# Patient Record
Sex: FEMALE | Race: WHITE | NOT HISPANIC OR LATINO | Employment: OTHER | URBAN - METROPOLITAN AREA
[De-identification: names, ages, dates, MRNs, and addresses within clinical notes are randomized per-mention and may not be internally consistent; named-entity substitution may affect disease eponyms.]

---

## 2021-06-11 ENCOUNTER — HOSPITAL ENCOUNTER (EMERGENCY)
Facility: HOSPITAL | Age: 86
Discharge: HOME | End: 2021-06-12
Attending: EMERGENCY MEDICINE | Admitting: EMERGENCY MEDICINE
Payer: MEDICARE

## 2021-06-11 ENCOUNTER — APPOINTMENT (EMERGENCY)
Dept: CT IMAGING | Facility: HOSPITAL | Age: 86
End: 2021-06-11
Payer: MEDICARE

## 2021-06-11 ENCOUNTER — APPOINTMENT (EMERGENCY)
Dept: RADIOLOGY | Facility: HOSPITAL | Age: 86
End: 2021-06-11
Payer: MEDICARE

## 2021-06-11 VITALS
HEART RATE: 75 BPM | TEMPERATURE: 99.5 F | OXYGEN SATURATION: 98 % | DIASTOLIC BLOOD PRESSURE: 67 MMHG | SYSTOLIC BLOOD PRESSURE: 146 MMHG | RESPIRATION RATE: 18 BRPM

## 2021-06-11 DIAGNOSIS — R10.13 EPIGASTRIC PAIN: Primary | ICD-10-CM

## 2021-06-11 DIAGNOSIS — R14.1 GAS PAIN: ICD-10-CM

## 2021-06-11 LAB
ALBUMIN SERPL BCP-MCNC: 3.9 G/DL (ref 3.4–4.8)
ALP SERPL-CCNC: 47.4 U/L (ref 35–140)
ALT SERPL W P-5'-P-CCNC: 11 U/L (ref 5–54)
ANION GAP SERPL CALCULATED.3IONS-SCNC: 10 MMOL/L (ref 4–13)
AST SERPL W P-5'-P-CCNC: 21 U/L (ref 15–41)
BASOPHILS # BLD AUTO: 0.03 THOUSANDS/ΜL (ref 0–0.1)
BASOPHILS NFR BLD AUTO: 0 % (ref 0–1)
BILIRUB SERPL-MCNC: 0.47 MG/DL (ref 0.3–1.2)
BNP SERPL-MCNC: 164 PG/ML (ref 1–100)
BUN SERPL-MCNC: 21 MG/DL (ref 6–20)
CALCIUM SERPL-MCNC: 9.3 MG/DL (ref 8.4–10.2)
CHLORIDE SERPL-SCNC: 104 MMOL/L (ref 96–108)
CO2 SERPL-SCNC: 29 MMOL/L (ref 22–33)
CREAT SERPL-MCNC: 0.87 MG/DL (ref 0.4–1.1)
EOSINOPHIL # BLD AUTO: 0.07 THOUSAND/ΜL (ref 0–0.61)
EOSINOPHIL NFR BLD AUTO: 0 % (ref 0–6)
ERYTHROCYTE [DISTWIDTH] IN BLOOD BY AUTOMATED COUNT: 12.9 % (ref 11.6–15.1)
GFR SERPL CREATININE-BSD FRML MDRD: 57 ML/MIN/1.73SQ M
GLUCOSE SERPL-MCNC: 156 MG/DL (ref 65–140)
HCT VFR BLD AUTO: 42 % (ref 34.8–46.1)
HGB BLD-MCNC: 13.5 G/DL (ref 11.5–15.4)
IMM GRANULOCYTES # BLD AUTO: 0.04 THOUSAND/UL (ref 0–0.2)
IMM GRANULOCYTES NFR BLD AUTO: 0 % (ref 0–2)
LIPASE SERPL-CCNC: 22 U/L (ref 13–60)
LYMPHOCYTES # BLD AUTO: 0.96 THOUSANDS/ΜL (ref 0.6–4.47)
LYMPHOCYTES NFR BLD AUTO: 6 % (ref 14–44)
MAGNESIUM SERPL-MCNC: 1.6 MG/DL (ref 1.6–2.6)
MCH RBC QN AUTO: 30.8 PG (ref 26.8–34.3)
MCHC RBC AUTO-ENTMCNC: 32.1 G/DL (ref 31.4–37.4)
MCV RBC AUTO: 96 FL (ref 82–98)
MONOCYTES # BLD AUTO: 0.99 THOUSAND/ΜL (ref 0.17–1.22)
MONOCYTES NFR BLD AUTO: 6 % (ref 4–12)
NEUTROPHILS # BLD AUTO: 14.64 THOUSANDS/ΜL (ref 1.85–7.62)
NEUTS SEG NFR BLD AUTO: 88 % (ref 43–75)
PLATELET # BLD AUTO: 227 THOUSANDS/UL (ref 149–390)
PMV BLD AUTO: 10.4 FL (ref 8.9–12.7)
POTASSIUM SERPL-SCNC: 4.3 MMOL/L (ref 3.5–5)
PROT SERPL-MCNC: 6.9 G/DL (ref 6.4–8.3)
RBC # BLD AUTO: 4.39 MILLION/UL (ref 3.81–5.12)
SODIUM SERPL-SCNC: 143 MMOL/L (ref 133–145)
TROPONIN I SERPL-MCNC: <0.03 NG/ML (ref 0–0.07)
WBC # BLD AUTO: 16.73 THOUSAND/UL (ref 4.31–10.16)

## 2021-06-11 PROCEDURE — 80053 COMPREHEN METABOLIC PANEL: CPT | Performed by: EMERGENCY MEDICINE

## 2021-06-11 PROCEDURE — 93005 ELECTROCARDIOGRAM TRACING: CPT

## 2021-06-11 PROCEDURE — 36415 COLL VENOUS BLD VENIPUNCTURE: CPT | Performed by: EMERGENCY MEDICINE

## 2021-06-11 PROCEDURE — 84484 ASSAY OF TROPONIN QUANT: CPT | Performed by: EMERGENCY MEDICINE

## 2021-06-11 PROCEDURE — 71045 X-RAY EXAM CHEST 1 VIEW: CPT

## 2021-06-11 PROCEDURE — 85025 COMPLETE CBC W/AUTO DIFF WBC: CPT | Performed by: EMERGENCY MEDICINE

## 2021-06-11 PROCEDURE — 83880 ASSAY OF NATRIURETIC PEPTIDE: CPT | Performed by: EMERGENCY MEDICINE

## 2021-06-11 PROCEDURE — 99285 EMERGENCY DEPT VISIT HI MDM: CPT

## 2021-06-11 PROCEDURE — 83690 ASSAY OF LIPASE: CPT | Performed by: EMERGENCY MEDICINE

## 2021-06-11 PROCEDURE — 83735 ASSAY OF MAGNESIUM: CPT | Performed by: EMERGENCY MEDICINE

## 2021-06-11 PROCEDURE — 71250 CT THORAX DX C-: CPT

## 2021-06-11 PROCEDURE — 99285 EMERGENCY DEPT VISIT HI MDM: CPT | Performed by: EMERGENCY MEDICINE

## 2021-06-11 RX ORDER — ASPIRIN 300 MG/1
300 SUPPOSITORY RECTAL ONCE
Status: COMPLETED | OUTPATIENT
Start: 2021-06-11 | End: 2021-06-11

## 2021-06-11 RX ORDER — LISINOPRIL 2.5 MG/1
2.5 TABLET ORAL DAILY
COMMUNITY

## 2021-06-11 RX ORDER — PRAVASTATIN SODIUM 40 MG
40 TABLET ORAL DAILY
COMMUNITY

## 2021-06-11 RX ORDER — CITALOPRAM 10 MG/1
10 TABLET ORAL DAILY
COMMUNITY

## 2021-06-11 RX ORDER — SODIUM CHLORIDE 9 MG/ML
3 INJECTION INTRAVENOUS
Status: DISCONTINUED | OUTPATIENT
Start: 2021-06-11 | End: 2021-06-12 | Stop reason: HOSPADM

## 2021-06-11 RX ORDER — VERAPAMIL HYDROCHLORIDE 120 MG/1
120 CAPSULE, EXTENDED RELEASE ORAL EVERY MORNING
COMMUNITY

## 2021-06-11 RX ORDER — DONEPEZIL HYDROCHLORIDE 5 MG/1
5 TABLET, FILM COATED ORAL EVERY MORNING
COMMUNITY

## 2021-06-11 RX ORDER — NYSTATIN 100000 [USP'U]/G
POWDER TOPICAL 2 TIMES DAILY PRN
COMMUNITY

## 2021-06-11 RX ORDER — ASPIRIN 81 MG/1
324 TABLET, CHEWABLE ORAL ONCE
Status: DISCONTINUED | OUTPATIENT
Start: 2021-06-11 | End: 2021-06-12 | Stop reason: HOSPADM

## 2021-06-11 RX ORDER — CLORAZEPATE DIPOTASSIUM 7.5 MG/1
7.5 TABLET ORAL DAILY
COMMUNITY

## 2021-06-11 RX ORDER — ASPIRIN 81 MG/1
81 TABLET ORAL DAILY
COMMUNITY

## 2021-06-11 RX ORDER — LOPERAMIDE HYDROCHLORIDE 2 MG/1
2 CAPSULE ORAL 4 TIMES DAILY PRN
COMMUNITY

## 2021-06-11 RX ORDER — ACETAMINOPHEN 325 MG/1
650 TABLET ORAL EVERY 6 HOURS PRN
COMMUNITY

## 2021-06-11 RX ADMIN — ASPIRIN 300 MG: 300 SUPPOSITORY RECTAL at 22:01

## 2021-06-12 LAB
ATRIAL RATE: 73 BPM
P AXIS: 42 DEGREES
PR INTERVAL: 161 MS
QRS AXIS: -31 DEGREES
QRSD INTERVAL: 80 MS
QT INTERVAL: 412 MS
QTC INTERVAL: 454 MS
VENTRICULAR RATE: 73 BPM

## 2021-06-12 PROCEDURE — 93010 ELECTROCARDIOGRAM REPORT: CPT | Performed by: INTERNAL MEDICINE

## 2021-06-12 NOTE — ED NOTES
Transport Information:  [facility] @ [address] [place] [address], [facility]0 [facility]  06/12/21 0006

## 2021-06-12 NOTE — ED PROCEDURE NOTE
PROCEDURE  ECG 12 Lead Documentation Only    Date/Time: 6/11/2021 9:26 PM  Performed by: Vinny Montesinos MD  Authorized by:  Vinny Montesinos MD     Indications / Diagnosis:  Epigastric pain  ECG reviewed by me, the ED Provider: yes    Patient location:  ED  Previous ECG:     Previous ECG:  Unavailable    Comparison to cardiac monitor: Yes    Interpretation:     Interpretation: abnormal    Rate:     ECG rate:  73    ECG rate assessment: normal    Rhythm:     Rhythm: sinus rhythm    Ectopy:     Ectopy: none    QRS:     QRS axis:  Left    QRS intervals:  Normal  Conduction:     Conduction: normal    ST segments:     ST segments:  Normal  T waves:     T waves: normal    Comments:      No acute ischemia or infarction         Vinny Montesinos MD  06/11/21 0134

## 2021-06-12 NOTE — ED PROVIDER NOTES
History  Chief Complaint   Patient presents with    Abdominal Pain     Pt presents to the ED from a SNF where staff reports pt was crying out for help and stating the she has abdominal pain  This is a 80year old female with a hx of hypertension, hyperlipidemia,OA, Depression, Macular Degeneration,Breast Cancer and Vaginal prolapse  And dementia that presented to the ED via EMS from a local skilled nursing facility  According to EMS, they were called for epigastric pain - they apprise that the patient was yelling help and calling for the nurses - she was holding her epigastric area and reported that she had pain  She was unable to describe the quality or rate the pain - denies radiation of the pain    EMS reports that when they placed her on the stretcher and sat her up she had a large belch and then advised that the pain had subsided  She denies pain on arrival    Alert to self and birthdate only  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  Taking?   acetaminophen (TYLENOL) 325 mg tablet Unknown at Unknown time  Yes No   Sig: Take 650 mg by mouth every 6 (six) hours as needed for mild pain or fever   aspirin (ECOTRIN LOW STRENGTH) 81 mg EC tablet 6/11/2021 at Unknown time  Yes Yes   Sig: Take 81 mg by mouth daily   citalopram (CeleXA) 10 mg tablet 6/11/2021 at Unknown time  Yes Yes   Sig: Take 10 mg by mouth daily   clorazepate (TRANXENE) 7 5 mg tablet 6/11/2021 at Unknown time  Yes Yes   Sig: Take 7 5 mg by mouth daily   donepezil (ARICEPT) 5 mg tablet 6/11/2021 at Unknown time  Yes Yes   Sig: Take 5 mg by mouth every morning   lisinopril (ZESTRIL) 2 5 mg tablet 6/11/2021 at Unknown time  Yes Yes   Sig: Take 2 5 mg by mouth daily   loperamide (IMODIUM) 2 mg capsule Unknown at Unknown time  Yes No   Sig: Take 2 mg by mouth 4 (four) times a day as needed for diarrhea   nystatin (MYCOSTATIN) powder Unknown at Unknown time  Yes No   Sig: Apply topically 2 (two) times a day as needed For irritation   pravastatin (PRAVACHOL) 40 mg tablet 6/11/2021 at Unknown time  Yes Yes   Sig: Take 40 mg by mouth daily   verapamil (VERELAN PM) 120 MG 24 hr capsule 6/11/2021 at Unknown time  Yes Yes   Sig: Take 120 mg by mouth every morning      Facility-Administered Medications: None       History reviewed  No pertinent past medical history  History reviewed  No pertinent surgical history  History reviewed  No pertinent family history  I have reviewed and agree with the history as documented  E-Cigarette/Vaping     E-Cigarette/Vaping Substances     Social History     Tobacco Use    Smoking status: Never Smoker    Smokeless tobacco: Never Used   Substance Use Topics    Alcohol use: Never     Frequency: Never    Drug use: Never       Review of Systems   Constitutional: Negative for activity change, appetite change, chills, diaphoresis, fatigue, fever and unexpected weight change  HENT: Negative for congestion, ear discharge, ear pain, mouth sores, sinus pressure, sinus pain, sneezing, sore throat, trouble swallowing and voice change  Eyes: Negative for photophobia, pain, discharge, redness, itching and visual disturbance  Respiratory: Negative for cough, chest tightness and shortness of breath  Cardiovascular: Negative for chest pain (epigastric), palpitations and leg swelling  Gastrointestinal: Positive for abdominal pain (Epigastric pain)  Negative for constipation, nausea and vomiting  Endocrine: Negative for cold intolerance, heat intolerance, polydipsia, polyphagia and polyuria  Genitourinary: Negative for decreased urine volume, difficulty urinating, dysuria, frequency, hematuria and urgency     Musculoskeletal: Negative for arthralgias, back pain, gait problem, joint swelling, myalgias, neck pain and neck stiffness  Skin: Negative for color change and rash  Allergic/Immunologic: Negative for immunocompromised state  Neurological: Negative for dizziness, tremors, seizures, syncope, speech difficulty, weakness, light-headedness, numbness and headaches  Hematological: Does not bruise/bleed easily  Psychiatric/Behavioral: Negative for behavioral problems and suicidal ideas  Physical Exam  Physical Exam  Vitals signs and nursing note reviewed  Constitutional:       General: She is not in acute distress  Appearance: Normal appearance  She is well-developed and normal weight  She is not ill-appearing, toxic-appearing or diaphoretic  HENT:      Head: Normocephalic and atraumatic  Right Ear: Tympanic membrane, ear canal and external ear normal  There is no impacted cerumen  Left Ear: Tympanic membrane, ear canal and external ear normal  There is no impacted cerumen  Nose: Nose normal  No congestion or rhinorrhea  Mouth/Throat:      Mouth: Mucous membranes are moist       Pharynx: Oropharynx is clear  No oropharyngeal exudate or posterior oropharyngeal erythema  Eyes:      General: No scleral icterus  Right eye: No discharge  Left eye: No discharge  Extraocular Movements: Extraocular movements intact  Conjunctiva/sclera: Conjunctivae normal       Pupils: Pupils are equal, round, and reactive to light  Comments: blind   Neck:      Musculoskeletal: Normal range of motion and neck supple  No neck rigidity or muscular tenderness  Thyroid: No thyromegaly  Vascular: No hepatojugular reflux or JVD  Trachea: No tracheal deviation  Cardiovascular:      Rate and Rhythm: Normal rate and regular rhythm  Pulses: Normal pulses  Carotid pulses are 2+ on the right side and 2+ on the left side  Radial pulses are 2+ on the right side and 2+ on the left side          Dorsalis pedis pulses are 2+ on the right side and 2+ on the left side  Posterior tibial pulses are 2+ on the right side and 2+ on the left side  Heart sounds: Normal heart sounds  No murmur  Pulmonary:      Effort: Pulmonary effort is normal  No accessory muscle usage or respiratory distress  Breath sounds: Normal breath sounds  No wheezing or rales  Chest:      Chest wall: No mass, deformity, tenderness, crepitus or edema  Abdominal:      General: Abdomen is protuberant  Bowel sounds are normal  There is no distension or abdominal bruit  Palpations: Abdomen is soft  There is no hepatomegaly, splenomegaly or mass  Tenderness: There is no abdominal tenderness  There is no right CVA tenderness, left CVA tenderness, guarding or rebound  Hernia: No hernia is present  Musculoskeletal: Normal range of motion  General: No tenderness  Right lower leg: She exhibits no tenderness  No edema  Left lower leg: She exhibits no tenderness  No edema  Lymphadenopathy:      Cervical: No cervical adenopathy  Skin:     General: Skin is warm and dry  Capillary Refill: Capillary refill takes less than 2 seconds  Findings: No ecchymosis or rash  Neurological:      General: No focal deficit present  Mental Status: She is alert  She is disoriented  Cranial Nerves: No cranial nerve deficit  Sensory: No sensory deficit  Motor: No weakness or abnormal muscle tone  Deep Tendon Reflexes: Reflexes normal       Comments: At baseline dementia   Psychiatric:         Mood and Affect: Mood normal          Behavior: Behavior normal          Thought Content:  Thought content normal          Judgment: Judgment normal          Vital Signs  ED Triage Vitals [06/11/21 2127]   Temperature Pulse Respirations Blood Pressure SpO2   99 5 °F (37 5 °C) 75 16 133/61 97 %      Temp Source Heart Rate Source Patient Position - Orthostatic VS BP Location FiO2 (%)   Oral Monitor Sitting Left arm --      Pain Score       No Pain           Vitals:    06/11/21 2127 06/11/21 2234   BP: 133/61 146/67   Pulse: 75 75   Patient Position - Orthostatic VS: Sitting Lying         Visual Acuity      ED Medications  Medications   sodium chloride (PF) 0 9 % injection 3 mL (has no administration in time range)   aspirin chewable tablet 324 mg (324 mg Oral Not Given 6/11/21 2138)   aspirin rectal suppository 300 mg (300 mg Rectal Given 6/11/21 2201)       Diagnostic Studies  Results Reviewed     Procedure Component Value Units Date/Time    B-Type Natriuretic Peptide ([address]) [[id number]] Collected: 06/11/21 2149    Lab Status:  In process Specimen: Blood from Arm, Left Updated: 06/11/21 2316    Troponin I [[id number]]  (Normal) Collected: 06/11/21 2149    Lab Status: Final result Specimen: Blood from Arm, Left Updated: 06/11/21 2215     Troponin I <0 03 ng/mL     Comprehensive metabolic panel [[id number]]  (Abnormal) Collected: 06/11/21 2149    Lab Status: Final result Specimen: Blood from Arm, Left Updated: 06/11/21 2212     Sodium 143 mmol/L      Potassium 4 3 mmol/L      Chloride 104 mmol/L      CO2 29 mmol/L      ANION GAP 10 mmol/L      BUN 21 mg/dL      Creatinine 0 87 mg/dL      Glucose 156 mg/dL      Calcium 9 3 mg/dL      AST 21 U/L      ALT 11 U/L      Alkaline Phosphatase 47 4 U/L      Total Protein 6 9 g/dL      Albumin 3 9 g/dL      Total Bilirubin 0 47 mg/dL      eGFR 57 ml/min/1 73sq m     Narrative:      [place] guidelines for Chronic Kidney Disease (CKD):     Stage 1 with normal or high GFR (GFR > 90 mL/min/1 73 square meters)    Stage 2 Mild CKD (GFR = 60-89 mL/min/1 73 square meters)    Stage 3A Moderate CKD (GFR = 45-59 mL/min/1 73 square meters)    Stage 3B Moderate CKD (GFR = 30-44 mL/min/1 73 square meters)    Stage 4 Severe CKD (GFR = 15-29 mL/min/1 73 square meters)    Stage 5 End Stage CKD (GFR <15 mL/min/1 73 square meters)  Note: GFR calculation is accurate only with a steady state creatinine    Lipase [[id number]]  (Normal) Collected: 06/11/21 2149    Lab Status: Final result Specimen: Blood from Arm, Left Updated: 06/11/21 2212     Lipase 22 u/L     Magnesium [[id number]]  (Normal) Collected: 06/11/21 2149    Lab Status: Final result Specimen: Blood from Arm, Left Updated: 06/11/21 2212     Magnesium 1 6 mg/dL     CBC and differential [[id number]]  (Abnormal) Collected: 06/11/21 2149    Lab Status: Final result Specimen: Blood from Arm, Left Updated: 06/11/21 2155     WBC 16 73 Thousand/uL      RBC 4 39 Million/uL      Hemoglobin 13 5 g/dL      Hematocrit 42 0 %      MCV 96 fL      MCH 30 8 pg      MCHC 32 1 g/dL      RDW 12 9 %      MPV 10 4 fL      Platelets 211 Thousands/uL      Neutrophils Relative 88 %      Immat GRANS % 0 %      Lymphocytes Relative 6 %      Monocytes Relative 6 %      Eosinophils Relative 0 %      Basophils Relative 0 %      Neutrophils Absolute 14 64 Thousands/µL      Immature Grans Absolute 0 04 Thousand/uL      Lymphocytes Absolute 0 96 Thousands/µL      Monocytes Absolute 0 99 Thousand/µL      Eosinophils Absolute 0 07 Thousand/µL      Basophils Absolute 0 03 Thousands/µL                  CT chest without contrast   Final Result by Alessandro Glass DO (06/11 2304)      No focal consolidation  Cardiomegaly with mild pericardial effusion  Workstation performed: [id number]         X-ray chest 1 view portable    (Results Pending)              Procedures  Procedures         ED Course  ED Course as of Jun 11 2335 Fri Jun 11, 2021   213 This 20-year-old female who resides in a local skilled nursing facility presented to the emergency department after she was calling for help and reported that she had epigastric pain  The only history that we have is provided by EMS as patient has baseline dementia  According to EMS she had epigastric pain at the nursing home    They states that the knee put her on the stretcher they put her in a sitting position she had a large bowel shin she states that the pain had dissipated  Patient has no pain here in the ED  There are no acute findings on her EKG  CBC has slight leukocytosis at 16 73 however there is no origin for infection  Hemoglobin hematocrit stable at 13 5 and 42  Platelets stable 947  Troponin was negative  Magnesium 1 6 lipase 22 CMP showed a slightly increased BUN of 21  Glucose 156  Creatinine normal at 0 87  Patient did not have any further pain here in the ED  I did give her aspirin on arrival I just due to the location of the pain  Chest x-ray the  There may be a aspiration pneumonia  I did a CT scan to get a better view  CT scan was interpreted as radiologist to be no acute findings  States patient remained pain-free in stable remained in sinus rhythm on the monitor and on room air her saturation is in the upper 90s  Patient stable for discharge  Patient was reexamined at this time and informed of laboratory and/or imaging results and was found to be stable for discharge  Return to emergency department criteria was reviewed with the patient who verbalized understanding and was agreeable to discharge and the treatment plan at this time  Portions of the record may have been created with voice recognition software  Occasional wrong word or "sound a like" substitutions may have occurred due to the inherent limitations of voice recognition software  Read the chart carefully and recognize, using context, where substitutions have occurred  SBIRT 22yo+      Most Recent Value   SBIRT (22 yo +)   In order to provide better care to our patients, we are screening all of our patients for alcohol and drug use  Would it be okay to ask you these screening questions?   No Filed at: 06/11/2021 2327                    MDM  Number of Diagnoses or Management Options  Epigastric pain: new and requires workup  Gas pain: new and requires workup  Diagnosis management comments: Concerned for ACS, GERD, gastritis, stomach gas and other returns    Will order cardiac work up and give ASA  Amount and/or Complexity of Data Reviewed  Clinical lab tests: ordered and reviewed  Tests in the radiology section of CPT®: ordered and reviewed  Obtain history from someone other than the patient: yes (EMS)  Independent visualization of images, tracings, or specimens: yes    Risk of Complications, Morbidity, and/or Mortality  Presenting problems: high  Diagnostic procedures: moderate  Management options: moderate    Patient Progress  Patient progress: improved      Disposition  Final diagnoses:   Epigastric pain   Gas pain     Time reflects when diagnosis was documented in both MDM as applicable and the Disposition within this note     Time User Action Codes Description Comment    6/11/2021 11:26 PM Justa Hong [R10 13] Epigastric pain     6/11/2021 11:27 PM Justa Hong [R14 1] Gas pain       ED Disposition     ED Disposition Condition Date/Time Comment    Discharge Stable Fri Jun 11, 2021 11:26 PM Lisa Second discharge to home/self care  Follow-up Information     Follow up With Specialties Details Why Contact Info    PCP    3 days          Patient's Medications   Discharge Prescriptions    No medications on file     No discharge procedures on file      PDMP Review       Value Time User    PDMP Reviewed  Yes 6/11/2021 11:27 PM Baldev De La Paz MD          ED Provider  Electronically Signed by           Baldev De La Paz MD  06/11/21 9887

## 2021-06-12 NOTE — DISCHARGE INSTRUCTIONS
Yashira Stanford was pain free on arrival to the ED    According to EMS she had a large belch and the pain subsided    Her work up was normal    A CT of the chest was done to rule out aspiration - ho acute findings on the CT of the chest

## 2021-06-12 NOTE — ED NOTES
RN called [facility] to arrange transport home for pt at this time        Almas Liu RN  06/11/21 5656

## 2024-10-19 ENCOUNTER — APPOINTMENT (EMERGENCY)
Dept: CT IMAGING | Facility: HOSPITAL | Age: 89
End: 2024-10-19
Payer: MEDICARE

## 2024-10-19 ENCOUNTER — HOSPITAL ENCOUNTER (EMERGENCY)
Facility: HOSPITAL | Age: 89
Discharge: HOME | End: 2024-10-19
Attending: EMERGENCY MEDICINE
Payer: MEDICARE

## 2024-10-19 VITALS
SYSTOLIC BLOOD PRESSURE: 153 MMHG | TEMPERATURE: 98.3 F | WEIGHT: 154.32 LBS | OXYGEN SATURATION: 95 % | RESPIRATION RATE: 16 BRPM | DIASTOLIC BLOOD PRESSURE: 66 MMHG | HEART RATE: 68 BPM

## 2024-10-19 DIAGNOSIS — H05.232 PERIORBITAL HEMATOMA OF LEFT EYE: Primary | ICD-10-CM

## 2024-10-19 DIAGNOSIS — S09.90XA TRAUMATIC INJURY OF HEAD, INITIAL ENCOUNTER: ICD-10-CM

## 2024-10-19 PROCEDURE — 99284 EMERGENCY DEPT VISIT MOD MDM: CPT

## 2024-10-19 PROCEDURE — 99285 EMERGENCY DEPT VISIT HI MDM: CPT | Performed by: EMERGENCY MEDICINE

## 2024-10-19 PROCEDURE — 70486 CT MAXILLOFACIAL W/O DYE: CPT

## 2024-10-19 PROCEDURE — 72125 CT NECK SPINE W/O DYE: CPT

## 2024-10-19 PROCEDURE — 70450 CT HEAD/BRAIN W/O DYE: CPT

## 2024-10-19 NOTE — ED PROVIDER NOTES
Emergency Department Trauma Note  Suzan Wiggins 89 y.o. female MRN: [id number]  Unit/Bed#: ED 10/ED 10 Encounter: [id number]      Trauma Alert: Trauma Acuity: Trauma Evaluation  Model of Arrival: Mode of Arrival: BLS via    Trauma Team: Current Providers  Attending Provider: Arnulfo Whyte DO  Registered Nurse: Nemo Lloyd RN  ED Technician: Berto Moran  Physician Assistant: Vincent Saleh PA-C  Consultants:     None      History of Present Illness     Chief Complaint:   Chief Complaint   Patient presents with    Fall     Arrives via EMS from [facility] with report of fall forward onto face while sitting on edge of bed, witnessed by staff, no LOC, pt does not take anticoagulants but does take daily aspirin. Pt is reportedly at her baseline mental status.     HPI:  Suzan Wiggins is a 89 y.o. female who presents with fall.  Mechanism:Details of Incident: Fall face foward from seated position on bed at nursing home, fell onto floor striking face Injury Date: 10/19/24 Injury Time: 1350 (approx) Injury Occurence Location - Specify County: [place]    This is a 89-year-old female with past medical history significant for advanced dementia from a nursing facility presenting to the emergency department today status post fall.  The patient fell forward from a seated position and struck her face and left sided eyebrow on the ground.  She takes aspirin daily.  The patient presents to the emergency department with periorbital swelling to the left eye.  Per nursing facility and the patient's son at bedside, the patient is at her baseline mental status.  When asked if the patient has any pain, the patient says no.  Per nursing home, no other areas of trauma.  She did not have a prolonged downtime.  The patient denies other complaints at this time.      History provided by:  Patient   used: No    Fall  Mechanism of injury: fall    Injury location:  Head/neck  Incident location:  nursing facility.  Time since incident: just PTA.  Arrived directly from scene: yes    Suspicion of alcohol use: no    Suspicion of drug use: no    Tetanus status:  Unknown  Prior to arrival data:     Patient ambulatory at scene: no      Loss of consciousness: no      Amnesic to event: no    Risk factors comment:  + ASA    Review of Systems   Unable to perform ROS: Dementia   Skin:  Positive for color change.   All other systems reviewed and are negative.      Historical Information     Immunizations:   Immunization History   Administered Date(s) Administered    COVID-19 MODERNA VACC 0.5 ML IM 01/22/2021, 02/26/2021, 11/12/2021    COVID-19 Moderna Vac BIVALENT 12 Yr+ IM 0.5 ML 10/31/2022    COVID-19 Moderna mRNA Vaccine 12 Yr+ 50 mcg/0.5 mL (Spikevax) 11/20/2023       No past medical history on file.  No family history on file.  No past surgical history on file.  Social History     Tobacco Use    Smoking status: Never    Smokeless tobacco: Never   Substance Use Topics    Alcohol use: Never    Drug use: Never     E-Cigarette/Vaping     E-Cigarette/Vaping Substances       Family History: non-contributory    Meds/Allergies   Prior to Admission Medications   Prescriptions Last Dose Informant Patient Reported? Taking?   acetaminophen (TYLENOL) 325 mg tablet   Yes No   Sig: Take 650 mg by mouth every 6 (six) hours as needed for mild pain or fever   aspirin (ECOTRIN LOW STRENGTH) 81 mg EC tablet   Yes No   Sig: Take 81 mg by mouth daily   citalopram (CeleXA) 10 mg tablet   Yes No   Sig: Take 10 mg by mouth daily   clorazepate (TRANXENE) 7.5 mg tablet   Yes No   Sig: Take 7.5 mg by mouth daily   donepezil (ARICEPT) 5 mg tablet   Yes No   Sig: Take 5 mg by mouth every morning   lisinopril (ZESTRIL) 2.5 mg tablet   Yes No   Sig: Take 2.5 mg by mouth daily   loperamide (IMODIUM) 2 mg capsule   Yes No   Sig: Take 2 mg by mouth 4 (four) times a day as needed for diarrhea   nystatin (MYCOSTATIN) powder   Yes No   Sig: Apply  topically 2 (two) times a day as needed For irritation   pravastatin (PRAVACHOL) 40 mg tablet   Yes No   Sig: Take 40 mg by mouth daily   verapamil (VERELAN PM) 120 MG 24 hr capsule   Yes No   Sig: Take 120 mg by mouth every morning      Facility-Administered Medications: None       Allergies   Allergen Reactions    Tetanus Toxoid Other (See Comments)     Unsure of reaction       PHYSICAL EXAM    PE limited by: dementia    Objective   Vitals:   First set: Temperature: 98.3 °F (36.8 °C) (10/19/24 1424)  Pulse: 68 (10/19/24 1424)  Respirations: 16 (10/19/24 1424)  Blood Pressure: 153/66 (10/19/24 1424)  SpO2: 95 % (10/19/24 1424)    Primary Survey:   (A) Airway: intact  (B) Breathing: bilateral breath sounds  (C) Circulation: Pulses:   2+ radial and DP pulses bilaterally  (D) Disabliity:  GCS Total:  13 (baseline for patient; she has dementia)  (E) Expose:  Completed    Secondary Survey: (Click on Physical Exam tab above)  Physical Exam  Vitals and nursing note reviewed.   Constitutional:       General: She is not in acute distress.     Appearance: Normal appearance. She is normal weight. She is not ill-appearing, toxic-appearing or diaphoretic.   HENT:      Head: Normocephalic.      Right Ear: Tympanic membrane, ear canal and external ear normal. There is no impacted cerumen.      Left Ear: Tympanic membrane, ear canal and external ear normal. There is no impacted cerumen.      Ears:      Comments: No hemotympanum or Nicholson sign bilaterally.     Nose: Nose normal. No congestion or rhinorrhea.      Mouth/Throat:      Mouth: Mucous membranes are moist.      Pharynx: No oropharyngeal exudate or posterior oropharyngeal erythema.   Eyes:      General: No scleral icterus.        Right eye: No discharge.         Left eye: No discharge.      Extraocular Movements: Extraocular movements intact.      Pupils: Pupils are equal, round, and reactive to light.      Comments: The patient has swelling and ecchymosis noted to the left  eyebrow and to the left periorbital region.  The left eye itself is without evidence of trauma.   Neck:      Comments: No tenderness to palpation to the midline cervical spine or bilateral paracervical musculature.  No midline step-offs or deformities.  Cardiovascular:      Rate and Rhythm: Normal rate and regular rhythm.      Pulses: Normal pulses.      Heart sounds: Normal heart sounds. No murmur heard.     No friction rub. No gallop.      Comments: 2+ radial and DP pulses bilaterally.  Pulmonary:      Effort: Pulmonary effort is normal. No respiratory distress.      Breath sounds: Normal breath sounds. No stridor. No wheezing, rhonchi or rales.   Chest:      Chest wall: No tenderness.   Abdominal:      Palpations: Abdomen is soft.   Musculoskeletal:         General: Normal range of motion.      Cervical back: Normal range of motion. No tenderness.      Right lower leg: No edema.      Left lower leg: No edema.   Skin:     General: Skin is warm and dry.      Capillary Refill: Capillary refill takes less than 2 seconds.      Coloration: Skin is not jaundiced or pale.   Neurological:      General: No focal deficit present.      Mental Status: She is alert. Mental status is at baseline.      Comments: GCS 13.  Baseline for patient.   Psychiatric:         Mood and Affect: Mood normal.         Behavior: Behavior normal.         Cervical spine cleared by clinical criteria? No (imaging required)      Invasive Devices       None                   Lab Results:   Results Reviewed       None                   Imaging Studies:   Direct to CT: Yes  TRAUMA - CT spine cervical wo contrast   Final Result by Javi Valenzuela MD (10/19 4321)      No cervical spine fracture or traumatic malalignment.      Degenerative changes as described above.      The study was marked in EPIC for immediate notification.            Workstation performed: [id number]         TRAUMA - CT facial bones wo contrast   Final Result by Javi Valenzuela,  "MD (10/19 4797)      No facial bone fracture.      Left frontal and preseptal left periorbital soft tissue hematoma.      The study was marked in EPIC for immediate notification.               Workstation performed: [id number]         TRAUMA - CT head wo contrast   Final Result by Javi Valenzuela MD (10/19 3074)      No acute intracranial abnormality.  Stable chronic microangiopathic changes within the brain.      Hyperdense extra-axial mass along the right posterior falx that probably represents a meningioma. This finding is similar to prior CT.      The study was marked in EPIC for immediate notification.                  Workstation performed: [id number]               Procedures  Procedures         ED Course           Medical Decision Making  89-year-old female presenting to the emergency department today status post head strike.  The patient fell from a seated position.  She struck her head on aspirin.  History of dementia with baseline GCS 13.  Per nursing facility, the patient is at her baseline mentation.  Vitals are stable.  Afebrile.  Trauma evaluation called.  CT head and cervical spine without any acute traumatic abnormalities.  CT facial bones without any bony fractures but the patient does have a hematoma noted.  The patient is stable for discharge at this time.  Follow-up outpatient.  Strict return precautions were given.  Recommend PCP follow-up as soon as possible. The patient and/or patient's proxy verify their understanding and agree to the plan at this time.  All questions answered to the patient and/or their proxy's satisfaction.  All labs reviewed and utilized in the medical decision making process (if labs were ordered).  Portions of the record may have been created with voice recognition software.  Occasional wrong word or \"sound a like\" substitutions may have occurred due to the inherent limitations of voice recognition software.  Read the chart carefully and recognize, using context, where " substitutions have occurred.    I reviewed prior notes.  Case discussed with son at bedside.    Problems Addressed:  Periorbital hematoma of left eye: undiagnosed new problem with uncertain prognosis  Traumatic injury of head, initial encounter: undiagnosed new problem with uncertain prognosis    Amount and/or Complexity of Data Reviewed  Independent Historian: guardian     Details: Son  Radiology: ordered. Decision-making details documented in ED Course.                Disposition  [facility] Transfer: No  Final diagnoses:   Periorbital hematoma of left eye   Traumatic injury of head, initial encounter     Time reflects when diagnosis was documented in both MDM as applicable and the Disposition within this note       Time User Action Codes Description Comment    10/19/2024  3:00 PM Vincent Saleh Add [H05.232] Periorbital hematoma of left eye     10/19/2024  3:00 PM Vincent Saleh Add [S09.90XA] Traumatic injury of head, initial encounter           ED Disposition       ED Disposition   Discharge    Condition   Stable    Date/Time   Sat Oct 19, 2024  3:01 PM    Comment   Suzan Wiggins discharge to home/self care.                   Follow-up Information       Follow up With Specialties Details Why Contact Info Additional Information    [facility] Emergency Medicine Go to  If symptoms worsen [address] [phone]  [phone] [facility], [address] [phone]    [facility] Schedule an appointment as soon as possible for a visit   [address] [place] [phone] [facility], [address] [place] [address], [phone]          Discharge Medication List as of 10/19/2024  3:01 PM        CONTINUE these medications which have NOT CHANGED    Details   acetaminophen (TYLENOL) 325 mg  tablet Take 650 mg by mouth every 6 (six) hours as needed for mild pain or fever, Historical Med      aspirin (ECOTRIN LOW STRENGTH) 81 mg EC tablet Take 81 mg by mouth daily, Historical Med      citalopram (CeleXA) 10 mg tablet Take 10 mg by mouth daily, Historical Med      clorazepate (TRANXENE) 7.5 mg tablet Take 7.5 mg by mouth daily, Historical Med      donepezil (ARICEPT) 5 mg tablet Take 5 mg by mouth every morning, Historical Med      lisinopril (ZESTRIL) 2.5 mg tablet Take 2.5 mg by mouth daily, Historical Med      loperamide (IMODIUM) 2 mg capsule Take 2 mg by mouth 4 (four) times a day as needed for diarrhea, Historical Med      nystatin (MYCOSTATIN) powder Apply topically 2 (two) times a day as needed For irritation, Historical Med      pravastatin (PRAVACHOL) 40 mg tablet Take 40 mg by mouth daily, Historical Med      verapamil (VERELAN PM) 120 MG 24 hr capsule Take 120 mg by mouth every morning, Historical Med           No discharge procedures on file.    PDMP Review         Value Time User    PDMP Reviewed  Yes 6/11/2021 11:27 PM Bubba De Paz MD            ED Provider  Electronically Signed by           Vincent Saleh PA-C  10/19/24 1944

## 2025-01-01 ENCOUNTER — HOME CARE VISIT (OUTPATIENT)
Dept: HOME HEALTH SERVICES | Facility: HOME HEALTHCARE | Age: OVER 89
End: 2025-01-01
Payer: MEDICARE

## 2025-01-01 ENCOUNTER — HOSPITAL ENCOUNTER (INPATIENT)
Facility: HOSPITAL | Age: OVER 89
LOS: 3 days | Discharge: HOSPICE | End: 2025-07-14
Attending: EMERGENCY MEDICINE | Admitting: INTERNAL MEDICINE
Payer: MEDICARE

## 2025-01-01 ENCOUNTER — APPOINTMENT (EMERGENCY)
Dept: RADIOLOGY | Facility: HOSPITAL | Age: OVER 89
End: 2025-01-01
Payer: MEDICARE

## 2025-01-01 ENCOUNTER — HOSPICE F2F VISIT (OUTPATIENT)
Dept: PALLIATIVE MEDICINE | Facility: HOSPITAL | Age: OVER 89
End: 2025-01-01

## 2025-01-01 ENCOUNTER — HOME CARE VISIT (OUTPATIENT)
Dept: HOME HEALTH SERVICES | Facility: HOME HEALTHCARE | Age: OVER 89
End: 2025-01-01
Attending: INTERNAL MEDICINE
Payer: MEDICARE

## 2025-01-01 ENCOUNTER — HOSPICE ADMISSION (OUTPATIENT)
Dept: HOME HOSPICE | Facility: HOSPICE | Age: OVER 89
End: 2025-01-01
Payer: MEDICARE

## 2025-01-01 ENCOUNTER — TELEPHONE (OUTPATIENT)
Dept: OTHER | Facility: OTHER | Age: OVER 89
End: 2025-01-01

## 2025-01-01 ENCOUNTER — HOME CARE VISIT (OUTPATIENT)
Dept: HOME HOSPICE | Facility: HOSPICE | Age: OVER 89
End: 2025-01-01
Payer: MEDICARE

## 2025-01-01 VITALS — RESPIRATION RATE: 24 BRPM | HEART RATE: 68 BPM

## 2025-01-01 VITALS
HEIGHT: 56 IN | DIASTOLIC BLOOD PRESSURE: 84 MMHG | BODY MASS INDEX: 34.67 KG/M2 | OXYGEN SATURATION: 93 % | TEMPERATURE: 101.3 F | SYSTOLIC BLOOD PRESSURE: 137 MMHG | RESPIRATION RATE: 30 BRPM | WEIGHT: 154.1 LBS | HEART RATE: 68 BPM

## 2025-01-01 VITALS — HEART RATE: 80 BPM | RESPIRATION RATE: 24 BRPM | SYSTOLIC BLOOD PRESSURE: 124 MMHG | DIASTOLIC BLOOD PRESSURE: 70 MMHG

## 2025-01-01 VITALS — HEART RATE: 60 BPM | RESPIRATION RATE: 32 BRPM

## 2025-01-01 DIAGNOSIS — R09.02 HYPOXIA: ICD-10-CM

## 2025-01-01 DIAGNOSIS — Z71.89 GOALS OF CARE, COUNSELING/DISCUSSION: ICD-10-CM

## 2025-01-01 DIAGNOSIS — T17.908A ASPIRATION INTO AIRWAY, INITIAL ENCOUNTER: Primary | ICD-10-CM

## 2025-01-01 LAB
ALBUMIN SERPL BCG-MCNC: 3.7 G/DL (ref 3.5–5)
ALBUMIN SERPL BCG-MCNC: 4.1 G/DL (ref 3.5–5)
ALP SERPL-CCNC: 50 U/L (ref 34–104)
ALP SERPL-CCNC: 56 U/L (ref 34–104)
ALT SERPL W P-5'-P-CCNC: 49 U/L (ref 7–52)
ALT SERPL W P-5'-P-CCNC: 49 U/L (ref 7–52)
AMORPH URATE CRY URNS QL MICRO: ABNORMAL /HPF
ANION GAP SERPL CALCULATED.3IONS-SCNC: 12 MMOL/L (ref 4–13)
ANION GAP SERPL CALCULATED.3IONS-SCNC: 13 MMOL/L (ref 4–13)
ANION GAP SERPL CALCULATED.3IONS-SCNC: 13 MMOL/L (ref 4–13)
ANISOCYTOSIS BLD QL SMEAR: PRESENT
APTT PPP: 29 SECONDS (ref 23–34)
AST SERPL W P-5'-P-CCNC: 46 U/L (ref 13–39)
AST SERPL W P-5'-P-CCNC: 52 U/L (ref 13–39)
ATRIAL RATE: 102 BPM
ATRIAL RATE: 102 BPM
ATRIAL RATE: 103 BPM
BACTERIA BLD CULT: ABNORMAL
BACTERIA BLD CULT: NORMAL
BACTERIA UR QL AUTO: ABNORMAL /HPF
BASOPHILS # BLD MANUAL: 0 THOUSAND/UL (ref 0–0.1)
BASOPHILS # BLD MANUAL: 0.17 THOUSAND/UL (ref 0–0.1)
BASOPHILS NFR MAR MANUAL: 0 % (ref 0–1)
BASOPHILS NFR MAR MANUAL: 1 % (ref 0–1)
BILIRUB SERPL-MCNC: 0.74 MG/DL (ref 0.2–1)
BILIRUB SERPL-MCNC: 0.86 MG/DL (ref 0.2–1)
BILIRUB UR QL STRIP: ABNORMAL
BUN SERPL-MCNC: 25 MG/DL (ref 5–25)
BUN SERPL-MCNC: 25 MG/DL (ref 5–25)
BUN SERPL-MCNC: 26 MG/DL (ref 5–25)
BURR CELLS BLD QL SMEAR: PRESENT
CALCIUM SERPL-MCNC: 9 MG/DL (ref 8.4–10.2)
CALCIUM SERPL-MCNC: 9.5 MG/DL (ref 8.4–10.2)
CALCIUM SERPL-MCNC: 9.5 MG/DL (ref 8.4–10.2)
CHLORIDE SERPL-SCNC: 106 MMOL/L (ref 96–108)
CHLORIDE SERPL-SCNC: 106 MMOL/L (ref 96–108)
CHLORIDE SERPL-SCNC: 108 MMOL/L (ref 96–108)
CLARITY UR: ABNORMAL
CO2 SERPL-SCNC: 22 MMOL/L (ref 21–32)
CO2 SERPL-SCNC: 24 MMOL/L (ref 21–32)
CO2 SERPL-SCNC: 24 MMOL/L (ref 21–32)
COLOR UR: ABNORMAL
CREAT SERPL-MCNC: 0.87 MG/DL (ref 0.6–1.3)
CREAT SERPL-MCNC: 0.92 MG/DL (ref 0.6–1.3)
CREAT SERPL-MCNC: 0.92 MG/DL (ref 0.6–1.3)
DACRYOCYTES BLD QL SMEAR: PRESENT
EOSINOPHIL # BLD MANUAL: 0 THOUSAND/UL (ref 0–0.4)
EOSINOPHIL # BLD MANUAL: 0.67 THOUSAND/UL (ref 0–0.4)
EOSINOPHIL NFR BLD MANUAL: 0 % (ref 0–6)
EOSINOPHIL NFR BLD MANUAL: 4 % (ref 0–6)
ERYTHROCYTE [DISTWIDTH] IN BLOOD BY AUTOMATED COUNT: 13.2 % (ref 11.6–15.1)
ERYTHROCYTE [DISTWIDTH] IN BLOOD BY AUTOMATED COUNT: 13.3 % (ref 11.6–15.1)
EST. AVERAGE GLUCOSE BLD GHB EST-MCNC: 134 MG/DL
FLUAV AG UPPER RESP QL IA.RAPID: NEGATIVE
FLUBV AG UPPER RESP QL IA.RAPID: NEGATIVE
GFR SERPL CREATININE-BSD FRML MDRD: 51 ML/MIN/1.73SQ M
GFR SERPL CREATININE-BSD FRML MDRD: 51 ML/MIN/1.73SQ M
GFR SERPL CREATININE-BSD FRML MDRD: 55 ML/MIN/1.73SQ M
GLUCOSE SERPL-MCNC: 219 MG/DL (ref 65–140)
GLUCOSE SERPL-MCNC: 233 MG/DL (ref 65–140)
GLUCOSE SERPL-MCNC: 233 MG/DL (ref 65–140)
GLUCOSE SERPL-MCNC: 234 MG/DL (ref 65–140)
GLUCOSE SERPL-MCNC: 234 MG/DL (ref 65–140)
GLUCOSE SERPL-MCNC: 249 MG/DL (ref 65–140)
GLUCOSE UR STRIP-MCNC: ABNORMAL MG/DL
GRAM STN SPEC: ABNORMAL
HBA1C MFR BLD: 6.3 %
HCT VFR BLD AUTO: 42.1 % (ref 34.8–46.1)
HCT VFR BLD AUTO: 43.6 % (ref 34.8–46.1)
HGB BLD-MCNC: 13.1 G/DL (ref 11.5–15.4)
HGB BLD-MCNC: 13.9 G/DL (ref 11.5–15.4)
HGB UR QL STRIP.AUTO: ABNORMAL
INR PPP: 1.07 (ref 0.85–1.19)
KETONES UR STRIP-MCNC: NEGATIVE MG/DL
L PNEUMO1 AG UR QL IA.RAPID: NEGATIVE
LACTATE SERPL-SCNC: 1.9 MMOL/L (ref 0.5–2)
LEUKOCYTE ESTERASE UR QL STRIP: NEGATIVE
LG PLATELETS BLD QL SMEAR: PRESENT
LYMPHOCYTES # BLD AUTO: 0.58 THOUSAND/UL (ref 0.6–4.47)
LYMPHOCYTES # BLD AUTO: 3 % (ref 14–44)
LYMPHOCYTES # BLD AUTO: 32 % (ref 14–44)
LYMPHOCYTES # BLD AUTO: 5.36 THOUSAND/UL (ref 0.6–4.47)
MACROCYTES BLD QL AUTO: PRESENT
MAGNESIUM SERPL-MCNC: 1.8 MG/DL (ref 1.9–2.7)
MCH RBC QN AUTO: 31.6 PG (ref 26.8–34.3)
MCH RBC QN AUTO: 32.1 PG (ref 26.8–34.3)
MCHC RBC AUTO-ENTMCNC: 31.1 G/DL (ref 31.4–37.4)
MCHC RBC AUTO-ENTMCNC: 31.9 G/DL (ref 31.4–37.4)
MCV RBC AUTO: 101 FL (ref 82–98)
MCV RBC AUTO: 102 FL (ref 82–98)
MONOCYTES # BLD AUTO: 0.58 THOUSAND/UL (ref 0–1.22)
MONOCYTES # BLD AUTO: 1.17 THOUSAND/UL (ref 0–1.22)
MONOCYTES NFR BLD: 3 % (ref 4–12)
MONOCYTES NFR BLD: 7 % (ref 4–12)
MRSA NOSE QL CULT: NORMAL
MUCOUS THREADS UR QL AUTO: ABNORMAL
MYELOCYTE ABSOLUTE CT: 0.17 THOUSAND/UL (ref 0–0.1)
MYELOCYTES NFR BLD MANUAL: 1 % (ref 0–1)
NEUTROPHILS # BLD MANUAL: 18.14 THOUSAND/UL (ref 1.85–7.62)
NEUTROPHILS # BLD MANUAL: 9.21 THOUSAND/UL (ref 1.85–7.62)
NEUTS BAND NFR BLD MANUAL: 10 % (ref 0–8)
NEUTS BAND NFR BLD MANUAL: 3 % (ref 0–8)
NEUTS SEG NFR BLD AUTO: 52 % (ref 43–75)
NEUTS SEG NFR BLD AUTO: 84 % (ref 43–75)
NITRITE UR QL STRIP: POSITIVE
NON-SQ EPI CELLS URNS QL MICRO: ABNORMAL /HPF
NRBC BLD AUTO-RTO: 1 /100 WBC (ref 0–2)
OVALOCYTES BLD QL SMEAR: PRESENT
P AXIS: 54 DEGREES
PH UR STRIP.AUTO: 6 [PH]
PHOSPHATE SERPL-MCNC: 3.7 MG/DL (ref 2.3–4.1)
PLATELET # BLD AUTO: 183 THOUSANDS/UL (ref 149–390)
PLATELET # BLD AUTO: 279 THOUSANDS/UL (ref 149–390)
PLATELET BLD QL SMEAR: ADEQUATE
PLATELET BLD QL SMEAR: ADEQUATE
PMV BLD AUTO: 10.3 FL (ref 8.9–12.7)
PMV BLD AUTO: 10.3 FL (ref 8.9–12.7)
POTASSIUM SERPL-SCNC: 4.2 MMOL/L (ref 3.5–5.3)
PR INTERVAL: 146 MS
PROCALCITONIN SERPL-MCNC: 0.07 NG/ML
PROCALCITONIN SERPL-MCNC: 0.43 NG/ML
PROT SERPL-MCNC: 6.5 G/DL (ref 6.4–8.4)
PROT SERPL-MCNC: 7.2 G/DL (ref 6.4–8.4)
PROT UR STRIP-MCNC: ABNORMAL MG/DL
PROTHROMBIN TIME: 14.3 SECONDS (ref 12.3–15)
QRS AXIS: -29 DEGREES
QRS AXIS: -38 DEGREES
QRS AXIS: -38 DEGREES
QRSD INTERVAL: 70 MS
QRSD INTERVAL: 70 MS
QRSD INTERVAL: 72 MS
QT INTERVAL: 296 MS
QT INTERVAL: 302 MS
QT INTERVAL: 302 MS
QTC INTERVAL: 387 MS
QTC INTERVAL: 430 MS
QTC INTERVAL: 430 MS
RBC # BLD AUTO: 4.14 MILLION/UL (ref 3.81–5.12)
RBC # BLD AUTO: 4.33 MILLION/UL (ref 3.81–5.12)
RBC #/AREA URNS AUTO: ABNORMAL /HPF
RBC MORPH BLD: PRESENT
RBC MORPH BLD: PRESENT
S AUREUS+CONS DNA BLD POS NAA+NON-PROBE: DETECTED
S PNEUM AG UR QL: NEGATIVE
SARS-COV+SARS-COV-2 AG RESP QL IA.RAPID: NEGATIVE
SODIUM SERPL-SCNC: 142 MMOL/L (ref 135–147)
SODIUM SERPL-SCNC: 143 MMOL/L (ref 135–147)
SODIUM SERPL-SCNC: 143 MMOL/L (ref 135–147)
SP GR UR STRIP.AUTO: >=1.03 (ref 1–1.03)
T WAVE AXIS: 111 DEGREES
T WAVE AXIS: 111 DEGREES
T WAVE AXIS: 147 DEGREES
TOXIC GRANULES BLD QL SMEAR: PRESENT
UROBILINOGEN UR QL STRIP.AUTO: 4 E.U./DL
VENTRICULAR RATE: 103 BPM
VENTRICULAR RATE: 122 BPM
VENTRICULAR RATE: 122 BPM
WBC # BLD AUTO: 16.74 THOUSAND/UL (ref 4.31–10.16)
WBC # BLD AUTO: 19.3 THOUSAND/UL (ref 4.31–10.16)
WBC #/AREA URNS AUTO: ABNORMAL /HPF

## 2025-01-01 PROCEDURE — 99232 SBSQ HOSP IP/OBS MODERATE 35: CPT | Performed by: PHYSICIAN ASSISTANT

## 2025-01-01 PROCEDURE — 99285 EMERGENCY DEPT VISIT HI MDM: CPT

## 2025-01-01 PROCEDURE — 87804 INFLUENZA ASSAY W/OPTIC: CPT | Performed by: EMERGENCY MEDICINE

## 2025-01-01 PROCEDURE — 85610 PROTHROMBIN TIME: CPT | Performed by: EMERGENCY MEDICINE

## 2025-01-01 PROCEDURE — 94760 N-INVAS EAR/PLS OXIMETRY 1: CPT

## 2025-01-01 PROCEDURE — 99223 1ST HOSP IP/OBS HIGH 75: CPT | Performed by: INTERNAL MEDICINE

## 2025-01-01 PROCEDURE — G0156 HHCP-SVS OF AIDE,EA 15 MIN: HCPCS

## 2025-01-01 PROCEDURE — 87040 BLOOD CULTURE FOR BACTERIA: CPT | Performed by: EMERGENCY MEDICINE

## 2025-01-01 PROCEDURE — 84100 ASSAY OF PHOSPHORUS: CPT | Performed by: INTERNAL MEDICINE

## 2025-01-01 PROCEDURE — 93005 ELECTROCARDIOGRAM TRACING: CPT

## 2025-01-01 PROCEDURE — 80053 COMPREHEN METABOLIC PANEL: CPT | Performed by: INTERNAL MEDICINE

## 2025-01-01 PROCEDURE — G0299 HHS/HOSPICE OF RN EA 15 MIN: HCPCS

## 2025-01-01 PROCEDURE — 84145 PROCALCITONIN (PCT): CPT | Performed by: EMERGENCY MEDICINE

## 2025-01-01 PROCEDURE — 83605 ASSAY OF LACTIC ACID: CPT | Performed by: EMERGENCY MEDICINE

## 2025-01-01 PROCEDURE — G0155 HHCP-SVS OF CSW,EA 15 MIN: HCPCS

## 2025-01-01 PROCEDURE — 71045 X-RAY EXAM CHEST 1 VIEW: CPT

## 2025-01-01 PROCEDURE — 80053 COMPREHEN METABOLIC PANEL: CPT | Performed by: EMERGENCY MEDICINE

## 2025-01-01 PROCEDURE — 94002 VENT MGMT INPAT INIT DAY: CPT

## 2025-01-01 PROCEDURE — 84145 PROCALCITONIN (PCT): CPT | Performed by: INTERNAL MEDICINE

## 2025-01-01 PROCEDURE — 81001 URINALYSIS AUTO W/SCOPE: CPT | Performed by: EMERGENCY MEDICINE

## 2025-01-01 PROCEDURE — 80048 BASIC METABOLIC PNL TOTAL CA: CPT

## 2025-01-01 PROCEDURE — 83735 ASSAY OF MAGNESIUM: CPT | Performed by: INTERNAL MEDICINE

## 2025-01-01 PROCEDURE — 99233 SBSQ HOSP IP/OBS HIGH 50: CPT | Performed by: PHYSICIAN ASSISTANT

## 2025-01-01 PROCEDURE — 85007 BL SMEAR W/DIFF WBC COUNT: CPT | Performed by: INTERNAL MEDICINE

## 2025-01-01 PROCEDURE — 85007 BL SMEAR W/DIFF WBC COUNT: CPT

## 2025-01-01 PROCEDURE — 87449 NOS EACH ORGANISM AG IA: CPT | Performed by: INTERNAL MEDICINE

## 2025-01-01 PROCEDURE — 83036 HEMOGLOBIN GLYCOSYLATED A1C: CPT | Performed by: INTERNAL MEDICINE

## 2025-01-01 PROCEDURE — 10330087 HSPC SERVICE INTENSITY ADD-ON

## 2025-01-01 PROCEDURE — 94003 VENT MGMT INPAT SUBQ DAY: CPT

## 2025-01-01 PROCEDURE — 82948 REAGENT STRIP/BLOOD GLUCOSE: CPT

## 2025-01-01 PROCEDURE — 85730 THROMBOPLASTIN TIME PARTIAL: CPT | Performed by: EMERGENCY MEDICINE

## 2025-01-01 PROCEDURE — 87154 CUL TYP ID BLD PTHGN 6+ TRGT: CPT | Performed by: EMERGENCY MEDICINE

## 2025-01-01 PROCEDURE — 36415 COLL VENOUS BLD VENIPUNCTURE: CPT

## 2025-01-01 PROCEDURE — 93010 ELECTROCARDIOGRAM REPORT: CPT | Performed by: INTERNAL MEDICINE

## 2025-01-01 PROCEDURE — 87811 SARS-COV-2 COVID19 W/OPTIC: CPT | Performed by: EMERGENCY MEDICINE

## 2025-01-01 PROCEDURE — 87081 CULTURE SCREEN ONLY: CPT | Performed by: INTERNAL MEDICINE

## 2025-01-01 PROCEDURE — 85027 COMPLETE CBC AUTOMATED: CPT | Performed by: INTERNAL MEDICINE

## 2025-01-01 PROCEDURE — 99239 HOSP IP/OBS DSCHRG MGMT >30: CPT | Performed by: PHYSICIAN ASSISTANT

## 2025-01-01 PROCEDURE — 99291 CRITICAL CARE FIRST HOUR: CPT | Performed by: EMERGENCY MEDICINE

## 2025-01-01 PROCEDURE — 85027 COMPLETE CBC AUTOMATED: CPT

## 2025-01-01 RX ORDER — MAGNESIUM SULFATE 1 G/100ML
1 INJECTION INTRAVENOUS ONCE
Status: DISCONTINUED | OUTPATIENT
Start: 2025-01-01 | End: 2025-01-01

## 2025-01-01 RX ORDER — LABETALOL HYDROCHLORIDE 5 MG/ML
10 INJECTION, SOLUTION INTRAVENOUS EVERY 4 HOURS PRN
Status: DISCONTINUED | OUTPATIENT
Start: 2025-01-01 | End: 2025-01-01

## 2025-01-01 RX ORDER — PRAVASTATIN SODIUM 40 MG
40 TABLET ORAL
Status: DISCONTINUED | OUTPATIENT
Start: 2025-01-01 | End: 2025-01-01

## 2025-01-01 RX ORDER — BISACODYL 10 MG
10 SUPPOSITORY, RECTAL RECTAL DAILY PRN
COMMUNITY
Start: 2025-01-01

## 2025-01-01 RX ORDER — CITALOPRAM HYDROBROMIDE 20 MG/1
20 TABLET ORAL DAILY
Status: DISCONTINUED | OUTPATIENT
Start: 2025-01-01 | End: 2025-01-01

## 2025-01-01 RX ORDER — OXYCODONE HCL 5 MG/5 ML
5 SOLUTION, ORAL ORAL EVERY 4 HOURS PRN
Qty: 90 ML | Refills: 0 | Status: SHIPPED | OUTPATIENT
Start: 2025-01-01 | End: 2025-01-01

## 2025-01-01 RX ORDER — LORAZEPAM 0.5 MG/1
0.5 TABLET ORAL EVERY 8 HOURS PRN
COMMUNITY
End: 2025-01-01

## 2025-01-01 RX ORDER — QUETIAPINE FUMARATE 50 MG/1
50 TABLET, FILM COATED ORAL 2 TIMES DAILY
COMMUNITY
End: 2025-01-01

## 2025-01-01 RX ORDER — LISINOPRIL 2.5 MG/1
2.5 TABLET ORAL DAILY
Status: DISCONTINUED | OUTPATIENT
Start: 2025-01-01 | End: 2025-01-01

## 2025-01-01 RX ORDER — LORAZEPAM 2 MG/ML
0.5 INJECTION INTRAMUSCULAR ONCE
Status: COMPLETED | OUTPATIENT
Start: 2025-01-01 | End: 2025-01-01

## 2025-01-01 RX ORDER — QUETIAPINE FUMARATE 25 MG/1
50 TABLET, FILM COATED ORAL 2 TIMES DAILY
Status: DISCONTINUED | OUTPATIENT
Start: 2025-01-01 | End: 2025-01-01

## 2025-01-01 RX ORDER — OXYCODONE HCL 5 MG/5 ML
5 SOLUTION, ORAL ORAL EVERY 4 HOURS PRN
Refills: 0 | Status: DISCONTINUED | OUTPATIENT
Start: 2025-01-01 | End: 2025-01-01 | Stop reason: HOSPADM

## 2025-01-01 RX ORDER — LORAZEPAM 0.5 MG/1
0.5 TABLET ORAL EVERY 6 HOURS PRN
Qty: 12 TABLET | Refills: 0 | Status: SHIPPED | OUTPATIENT
Start: 2025-01-01 | End: 2025-01-01

## 2025-01-01 RX ORDER — SODIUM CHLORIDE 9 MG/ML
75 INJECTION, SOLUTION INTRAVENOUS CONTINUOUS
Status: DISCONTINUED | OUTPATIENT
Start: 2025-01-01 | End: 2025-01-01

## 2025-01-01 RX ORDER — METOPROLOL TARTRATE 1 MG/ML
5 INJECTION, SOLUTION INTRAVENOUS EVERY 6 HOURS PRN
Status: DISCONTINUED | OUTPATIENT
Start: 2025-01-01 | End: 2025-01-01

## 2025-01-01 RX ORDER — HYOSCYAMINE SULFATE 0.12 MG/1
0.12 TABLET SUBLINGUAL EVERY 6 HOURS PRN
Status: DISCONTINUED | OUTPATIENT
Start: 2025-01-01 | End: 2025-01-01 | Stop reason: HOSPADM

## 2025-01-01 RX ORDER — CLORAZEPATE DIPOTASSIUM 7.5 MG/1
7.5 TABLET ORAL DAILY
Status: DISCONTINUED | OUTPATIENT
Start: 2025-01-01 | End: 2025-01-01

## 2025-01-01 RX ORDER — VERAPAMIL HYDROCHLORIDE 120 MG/1
120 TABLET, FILM COATED, EXTENDED RELEASE ORAL DAILY
Status: DISCONTINUED | OUTPATIENT
Start: 2025-01-01 | End: 2025-01-01

## 2025-01-01 RX ORDER — HEPARIN SODIUM 5000 [USP'U]/ML
5000 INJECTION, SOLUTION INTRAVENOUS; SUBCUTANEOUS EVERY 8 HOURS SCHEDULED
Status: DISCONTINUED | OUTPATIENT
Start: 2025-01-01 | End: 2025-01-01

## 2025-01-01 RX ORDER — HYOSCYAMINE SULFATE 0.12 MG/1
0.12 TABLET SUBLINGUAL EVERY 6 HOURS PRN
Qty: 12 TABLET | Refills: 0
Start: 2025-01-01 | End: 2025-07-20

## 2025-01-01 RX ORDER — HYOSCYAMINE SULFATE 0.12 MG/1
0.12 TABLET SUBLINGUAL EVERY 6 HOURS PRN
Status: ON HOLD | COMMUNITY
End: 2025-01-01

## 2025-01-01 RX ORDER — DONEPEZIL HYDROCHLORIDE 5 MG/1
5 TABLET, FILM COATED ORAL EVERY MORNING
Status: DISCONTINUED | OUTPATIENT
Start: 2025-01-01 | End: 2025-01-01

## 2025-01-01 RX ORDER — LORAZEPAM 2 MG/ML
0.5 INJECTION INTRAMUSCULAR EVERY 6 HOURS PRN
Status: DISCONTINUED | OUTPATIENT
Start: 2025-01-01 | End: 2025-01-01 | Stop reason: HOSPADM

## 2025-01-01 RX ORDER — ACETAMINOPHEN 10 MG/ML
1000 INJECTION, SOLUTION INTRAVENOUS EVERY 6 HOURS PRN
Status: DISCONTINUED | OUTPATIENT
Start: 2025-01-01 | End: 2025-01-01

## 2025-01-01 RX ORDER — INSULIN LISPRO 100 [IU]/ML
1-5 INJECTION, SOLUTION INTRAVENOUS; SUBCUTANEOUS EVERY 6 HOURS
Status: DISCONTINUED | OUTPATIENT
Start: 2025-01-01 | End: 2025-01-01

## 2025-01-01 RX ORDER — DOCUSATE SODIUM 50 MG/5ML
15 LIQUID ORAL DAILY PRN
COMMUNITY
End: 2025-01-01

## 2025-01-01 RX ORDER — ONDANSETRON 2 MG/ML
4 INJECTION INTRAMUSCULAR; INTRAVENOUS EVERY 4 HOURS PRN
Status: DISCONTINUED | OUTPATIENT
Start: 2025-01-01 | End: 2025-01-01

## 2025-01-01 RX ORDER — HYDROMORPHONE HCL/PF 1 MG/ML
0.3 SYRINGE (ML) INJECTION
Status: DISCONTINUED | OUTPATIENT
Start: 2025-01-01 | End: 2025-01-01 | Stop reason: HOSPADM

## 2025-01-01 RX ORDER — ASPIRIN 81 MG/1
81 TABLET ORAL DAILY
Status: DISCONTINUED | OUTPATIENT
Start: 2025-01-01 | End: 2025-01-01

## 2025-01-01 RX ORDER — BISACODYL 10 MG
10 SUPPOSITORY, RECTAL RECTAL DAILY PRN
Status: DISCONTINUED | OUTPATIENT
Start: 2025-01-01 | End: 2025-01-01 | Stop reason: HOSPADM

## 2025-01-01 RX ORDER — ONDANSETRON 2 MG/ML
4 INJECTION INTRAMUSCULAR; INTRAVENOUS EVERY 4 HOURS PRN
Status: DISCONTINUED | OUTPATIENT
Start: 2025-01-01 | End: 2025-01-01 | Stop reason: HOSPADM

## 2025-01-01 RX ORDER — CITALOPRAM HYDROBROMIDE 10 MG/1
10 TABLET ORAL DAILY
Status: DISCONTINUED | OUTPATIENT
Start: 2025-01-01 | End: 2025-01-01

## 2025-01-01 RX ORDER — GUAIFENESIN 200 MG/10ML
10 LIQUID ORAL EVERY 6 HOURS PRN
COMMUNITY
End: 2025-01-01

## 2025-01-01 RX ORDER — IPRATROPIUM BROMIDE AND ALBUTEROL SULFATE 2.5; .5 MG/3ML; MG/3ML
3 SOLUTION RESPIRATORY (INHALATION) EVERY 2 HOUR PRN
COMMUNITY
End: 2025-01-01

## 2025-01-01 RX ORDER — ONDANSETRON 4 MG/1
4 TABLET, ORALLY DISINTEGRATING ORAL EVERY 4 HOURS PRN
Status: DISCONTINUED | OUTPATIENT
Start: 2025-01-01 | End: 2025-01-01 | Stop reason: HOSPADM

## 2025-01-01 RX ORDER — ONDANSETRON 4 MG/1
4 TABLET, ORALLY DISINTEGRATING ORAL EVERY 4 HOURS PRN
Start: 2025-01-01 | End: 2025-01-01

## 2025-01-01 RX ADMIN — HYDROMORPHONE HYDROCHLORIDE 0.3 MG: 1 INJECTION, SOLUTION INTRAMUSCULAR; INTRAVENOUS; SUBCUTANEOUS at 01:57

## 2025-01-01 RX ADMIN — INSULIN LISPRO 2 UNITS: 100 INJECTION, SOLUTION INTRAVENOUS; SUBCUTANEOUS at 08:19

## 2025-01-01 RX ADMIN — OXYCODONE HYDROCHLORIDE 5 MG: 5 SOLUTION ORAL at 00:25

## 2025-01-01 RX ADMIN — MAGNESIUM SULFATE HEPTAHYDRATE 1 G: 1 INJECTION, SOLUTION INTRAVENOUS at 09:08

## 2025-01-01 RX ADMIN — OXYCODONE HYDROCHLORIDE 5 MG: 5 SOLUTION ORAL at 12:25

## 2025-01-01 RX ADMIN — AMPICILLIN SODIUM AND SULBACTAM SODIUM 3 G: 2; 1 INJECTION, POWDER, FOR SOLUTION INTRAMUSCULAR; INTRAVENOUS at 20:05

## 2025-01-01 RX ADMIN — SODIUM CHLORIDE 75 ML/HR: 0.9 INJECTION, SOLUTION INTRAVENOUS at 21:34

## 2025-01-01 RX ADMIN — HYDROMORPHONE HYDROCHLORIDE 0.3 MG: 1 INJECTION, SOLUTION INTRAMUSCULAR; INTRAVENOUS; SUBCUTANEOUS at 11:57

## 2025-01-01 RX ADMIN — METOPROLOL TARTRATE 5 MG: 5 INJECTION INTRAVENOUS at 00:05

## 2025-01-01 RX ADMIN — ACETAMINOPHEN 325 MG: 325 SUPPOSITORY RECTAL at 22:09

## 2025-01-01 RX ADMIN — LORAZEPAM 0.5 MG: 2 INJECTION INTRAMUSCULAR; INTRAVENOUS at 06:26

## 2025-01-01 RX ADMIN — HEPARIN SODIUM 5000 UNITS: 5000 INJECTION, SOLUTION INTRAVENOUS; SUBCUTANEOUS at 22:57

## 2025-01-01 RX ADMIN — HYDROMORPHONE HYDROCHLORIDE 0.3 MG: 1 INJECTION, SOLUTION INTRAMUSCULAR; INTRAVENOUS; SUBCUTANEOUS at 07:35

## 2025-01-01 RX ADMIN — ACETAMINOPHEN 325 MG: 325 SUPPOSITORY RECTAL at 06:16

## 2025-01-01 RX ADMIN — LORAZEPAM 0.5 MG: 2 INJECTION INTRAMUSCULAR; INTRAVENOUS at 15:55

## 2025-01-01 RX ADMIN — HYDROMORPHONE HYDROCHLORIDE 0.3 MG: 1 INJECTION, SOLUTION INTRAMUSCULAR; INTRAVENOUS; SUBCUTANEOUS at 14:51

## 2025-01-01 RX ADMIN — LORAZEPAM 0.5 MG: 2 INJECTION INTRAMUSCULAR; INTRAVENOUS at 21:43

## 2025-01-01 RX ADMIN — HYDROMORPHONE HYDROCHLORIDE 0.3 MG: 1 INJECTION, SOLUTION INTRAMUSCULAR; INTRAVENOUS; SUBCUTANEOUS at 08:53

## 2025-01-01 RX ADMIN — LORAZEPAM 0.5 MG: 2 INJECTION INTRAMUSCULAR; INTRAVENOUS at 23:59

## 2025-01-01 RX ADMIN — AMPICILLIN SODIUM AND SULBACTAM SODIUM 3 G: 2; 1 INJECTION, POWDER, FOR SOLUTION INTRAMUSCULAR; INTRAVENOUS at 08:19

## 2025-01-01 RX ADMIN — ACETAMINOPHEN 325 MG: 325 SUPPOSITORY RECTAL at 21:44

## 2025-01-01 RX ADMIN — LORAZEPAM 0.5 MG: 2 INJECTION INTRAMUSCULAR; INTRAVENOUS at 10:05

## 2025-01-01 RX ADMIN — LORAZEPAM 0.5 MG: 2 INJECTION INTRAMUSCULAR; INTRAVENOUS at 08:20

## 2025-01-01 RX ADMIN — ACETAMINOPHEN 325 MG: 325 SUPPOSITORY RECTAL at 11:27

## 2025-01-01 RX ADMIN — HEPARIN SODIUM 5000 UNITS: 5000 INJECTION, SOLUTION INTRAVENOUS; SUBCUTANEOUS at 05:13

## 2025-01-01 RX ADMIN — LORAZEPAM 0.5 MG: 2 INJECTION INTRAMUSCULAR; INTRAVENOUS at 22:10

## 2025-07-11 PROBLEM — A41.9 SEVERE SEPSIS (HCC): Status: ACTIVE | Noted: 2025-01-01

## 2025-07-11 PROBLEM — J69.0 ASPIRATION PNEUMONIA (HCC): Status: ACTIVE | Noted: 2025-01-01

## 2025-07-11 PROBLEM — E78.5 HYPERLIPIDEMIA: Status: ACTIVE | Noted: 2025-01-01

## 2025-07-11 PROBLEM — F03.90 DEMENTIA (HCC): Status: ACTIVE | Noted: 2025-01-01

## 2025-07-11 PROBLEM — I48.91 ATRIAL FIBRILLATION (HCC): Status: ACTIVE | Noted: 2025-01-01

## 2025-07-11 PROBLEM — R65.20 SEVERE SEPSIS (HCC): Status: ACTIVE | Noted: 2025-01-01

## 2025-07-11 PROBLEM — I10 ESSENTIAL HYPERTENSION: Status: ACTIVE | Noted: 2025-01-01

## 2025-07-11 PROBLEM — J96.01 ACUTE RESPIRATORY FAILURE WITH HYPOXIA (HCC): Status: ACTIVE | Noted: 2025-01-01

## 2025-07-11 NOTE — ED PROVIDER NOTES
Time reflects when diagnosis was documented in both MDM as applicable and the Disposition within this note       Time User Action Codes Description Comment    7/11/2025  8:01 PM Lucas Marin Add [T17.908A] Aspiration into airway, initial encounter     7/11/2025  8:01 PM Lucas Marin Add [R09.02] Hypoxia           ED Disposition       ED Disposition   Admit    Condition   Stable    Date/Time   Fri Jul 11, 2025  8:00 PM    Comment   Case was discussed with MARJ and the patient's admission status was agreed to be Admission Status: inpatient status to the service of Dr. Cassidy .               Assessment & Plan       Medical Decision Making  Patient seen and examined. Physical exam is notable for tachycardia, distress, coarse lung sounds, choking sounds. Remainder of exam is within normal limits.  Patient immediately suctioned with removal of fluid from the back of the throat.  Oxygen saturation increased to mid to high 80s on nonrebreather.  Patient placed on BiPAP with improvement in oxygen saturations to mid 90s and improvement in patient's comfort and work of breathing.    Differential: Acute aspiration    Appropriate labs and imaging ordered.     Labs notable for leukocytosis. Imaging shows no acute abnormalities. This supports a diagnosis of aspiration. All results discussed with patient's princess Pina, they express understanding.     Patient's princess Pina is agreeable to admission. Case discussed with Walt who accepted the patient for admission. All questions answered.      Amount and/or Complexity of Data Reviewed  Labs: ordered. Decision-making details documented in ED Course.  Radiology: ordered and independent interpretation performed.    Risk  Decision regarding hospitalization.        ED Course as of 07/11/25 2047 Fri Jul 11, 2025   1839 I attempted to call princess Pina, no answer.  I attempted to leave a message, message sister stated to leave a message after the tone however there was no tone, message left  anyway, unsure if therapy will be able to get the message.   1930 WBC(!): 16.74   1930 Yovani called back and spoke to Dr. Mann; keep her at the hospital, keep her comfortable, agreeable to antibiotics, remains DNR/DNI       Medications   heparin (porcine) subcutaneous injection 5,000 Units (has no administration in time range)   ampicillin-sulbactam (UNASYN) 3 g in sodium chloride 0.9 % 100 mL IVPB (3 g Intravenous New Bag 7/11/25 2005)       ED Risk Strat Scores                    No data recorded        SBIRT 20yo+      Flowsheet Row Most Recent Value   Initial Alcohol Screen: US AUDIT-C     1. How often do you have a drink containing alcohol? 0 Filed at: 07/11/2025 1843   2. How many drinks containing alcohol do you have on a typical day you are drinking?  0 Filed at: 07/11/2025 1843   3b. FEMALE Any Age, or MALE 65+: How often do you have 4 or more drinks on one occassion? 0 Filed at: 07/11/2025 1843   Audit-C Score 0 Filed at: 07/11/2025 1843   ALXE: How many times in the past year have you...    Used an illegal drug or used a prescription medication for non-medical reasons? Never Filed at: 07/11/2025 1843                            History of Present Illness       Chief Complaint   Patient presents with    Aspiration w/severe Dyspnea     Pt brought in EMS after aspirating on cherry pie.        Past Medical History[1]   Past Surgical History[2]   Family History[3]   Social History[4]   E-Cigarette/Vaping      E-Cigarette/Vaping Substances      I have reviewed and agree with the history as documented.     Suzan Wiggins is a 89 y.o. female     They presented to the emergency department on July 11, 2025. Patient presents with:  Aspiration w/severe Dyspnea: Pt brought in EMS after aspirating on cherry pie.   .    Per EMS patient resides at a care facility directly across the street and is DNR DNI [facility].  She is supposed to eat soft foods but was eating cherry cobbler and aspirated.  EMS was called to the facility  where they put the patient on nonrebreather for hypoxia and suctioned her mouth.  EMS got her up to low 80s on nonrebreather.  On arrival to the emergency department suction was hooked up with a small amount of suction from the back of her mouth, saturation came up to [place] from the mid to high 80s on nonrebreather.  Respiratory therapy was called to start BiPAP.  Patient's son was called, no answer initially however son called back and confirmed the patient is DNR/DNI.  Son reversed do not hospitalize order and requested admission and IV antibiotics.           History provided by:  EMS personnel      Review of Systems   Reason unable to perform ROS: Patient unable to communicate effectively due to respiratory distress.   Respiratory:  Positive for choking.            Objective       ED Triage Vitals   Temperature Pulse Blood Pressure Respirations SpO2 Patient Position - Orthostatic VS   07/11/25 1833 07/11/25 1833 07/11/25 1833 07/11/25 1833 07/11/25 1735 --   97.8 °F (36.6 °C) (!) 109 170/82 (!) 30 90 %       Temp Source Heart Rate Source BP Location FiO2 (%) Pain Score    07/11/25 1833 -- -- -- --    Axillary          Vitals      Date and Time Temp Pulse SpO2 Resp BP Pain Score FACES Pain Rating User   07/11/25 2000 -- 92 97 % 32 123/57 -- -- MO   07/11/25 1945 -- 95 98 % 30 122/72 -- -- MO   07/11/25 1930 -- 100 97 % 28 105/74 -- -- MO   07/11/25 1845 -- 97 95 % 28 110/56 -- -- MO   07/11/25 1833 97.8 °F (36.6 °C) 109 90 % 30 170/82 -- -- KH   07/11/25 1735 -- -- 90 % -- -- -- -- JERMAINE            Physical Exam  Constitutional:       General: She is in acute distress.      Appearance: She is diaphoretic.   HENT:      Head: Normocephalic and atraumatic.      Nose: No congestion or rhinorrhea.      Mouth/Throat:      Mouth: Mucous membranes are moist.      Pharynx: No oropharyngeal exudate.     Eyes:      General: No scleral icterus.      Cardiovascular:      Rate and Rhythm: Normal rate and regular rhythm.       Heart sounds: Normal heart sounds. No murmur heard.     No friction rub. No gallop.   Pulmonary:      Effort: Tachypnea and respiratory distress present.      Breath sounds: Examination of the right-upper field reveals rhonchi. Examination of the left-upper field reveals rhonchi. Examination of the right-middle field reveals rhonchi. Examination of the left-middle field reveals rhonchi. Examination of the right-lower field reveals rhonchi. Examination of the left-lower field reveals rhonchi. Rhonchi present. No wheezing or rales.   Abdominal:      General: Abdomen is flat. There is no distension.      Palpations: Abdomen is soft.      Tenderness: There is no abdominal tenderness. There is no guarding.   Lymphadenopathy:      Cervical: No cervical adenopathy.     Skin:     General: Skin is warm.      Capillary Refill: Capillary refill takes less than 2 seconds.      Findings: No rash.     Neurological:      General: No focal deficit present.      Mental Status: She is alert and oriented to person, place, and time.         Results Reviewed       Procedure Component Value Units Date/Time    Platelet count [[id number]]     Lab Status: No result Specimen: Blood     FLU/COVID Rapid Antigen (30 min. TAT) - Preferred screening test in ED [[id number]]  (Normal) Collected: 07/11/25 1958    Lab Status: Final result Specimen: Nares from Nose Updated: 07/11/25 2028     SARS COV Rapid Antigen Negative     Influenza A Rapid Antigen Negative     Influenza B Rapid Antigen Negative    Narrative:      This test has been performed using the Quidel Netta 2 FLU+SARS Antigen test under the Emergency Use Authorization (EUA). This test has been validated by the  and verified by the performing laboratory. The Netta uses lateral flow immunofluorescent sandwich assay to detect SARS-COV, Influenza A and Influenza B Antigen.     The Quidel Netta 2 SARS Antigen test does not differentiate between SARS-CoV and SARS-CoV-2.     Negative  results are presumptive and may be confirmed with a molecular assay, if necessary, for patient management. Negative results do not rule out SARS-CoV-2 or influenza infection and should not be used as the sole basis for treatment or patient management decisions. A negative test result may occur if the level of antigen in a sample is below the limit of detection of this test.     Positive results are indicative of the presence of viral antigens, but do not rule out bacterial infection or co-infection with other viruses.     All test results should be used as an adjunct to clinical observations and other information available to the provider.    FOR PEDIATRIC PATIENTS - copy/paste COVID Guidelines URL to browser: [email or web address]    Lactic acid [[id number]]  (Normal) Collected: 07/11/25 2003    Lab Status: Final result Specimen: Blood from Arm, Left Updated: 07/11/25 2024     LACTIC ACID 1.9 mmol/L     Narrative:      Result may be elevated if tourniquet was used during collection.    Comprehensive metabolic panel [[id number]]  (Abnormal) Collected: 07/11/25 1843    Lab Status: Final result Specimen: Blood from Arm, Right Updated: 07/11/25 2022     Sodium 143 mmol/L      Potassium 4.2 mmol/L      Chloride 106 mmol/L      CO2 24 mmol/L      ANION GAP 13 mmol/L      BUN 25 mg/dL      Creatinine 0.92 mg/dL      Glucose 234 mg/dL      Calcium 9.5 mg/dL      AST 46 U/L      ALT 49 U/L      Alkaline Phosphatase 56 U/L      Total Protein 7.2 g/dL      Albumin 4.1 g/dL      Total Bilirubin 0.74 mg/dL      eGFR 51 ml/min/1.73sq m     Narrative:      National Kidney Disease Foundation guidelines for Chronic Kidney Disease (CKD):     Stage 1 with normal or high GFR (GFR > 90 mL/min/1.73 square meters)    Stage 2 Mild CKD (GFR = 60-89 mL/min/1.73 square meters)    Stage 3A Moderate CKD (GFR = 45-59 mL/min/1.73 square meters)    Stage 3B Moderate CKD (GFR = 30-44 mL/min/1.73  square meters)    Stage 4 Severe CKD (GFR = 15-29 mL/min/1.73 square meters)    Stage 5 End Stage CKD (GFR <15 mL/min/1.73 square meters)  Note: GFR calculation is accurate only with a steady state creatinine    Protime-INR [[id number]]  (Normal) Collected: 07/11/25 1958    Lab Status: Final result Specimen: Blood from Arm, Right Updated: 07/11/25 2015     Protime 14.3 seconds      INR 1.07    Narrative:      INR Therapeutic Range    Indication                                             INR Range      Atrial Fibrillation                                               2.0-3.0  Hypercoagulable State                                    2.0.2.3  Left Ventricular Asist Device                            2.0-3.0  Mechanical Heart Valve                                  -    Aortic(with afib, MI, embolism, HF, LA enlargement,    and/or coagulopathy)                                     2.0-3.0 (2.5-3.5)     Mitral                                                             2.5-3.5  Prosthetic/Bioprosthetic Heart Valve               2.0-3.0  Venous thromboembolism (VTE: VT, PE        2.0-3.0    APTT [[id number]]  (Normal) Collected: 07/11/25 1958    Lab Status: Final result Specimen: Blood from Arm, Right Updated: 07/11/25 2015     PTT 29 seconds     Blood culture #2 [[id number]] Collected: 07/11/25 2005    Lab Status: In process Specimen: Blood from Arm, Right Updated: 07/11/25 2012    Blood culture #1 [[id number]] Collected: 07/11/25 2003    Lab Status: In process Specimen: Blood from Arm, Left Updated: 07/11/25 2012    RBC Morphology Reflex Test [[id number]] Collected: 07/11/25 1843    Lab Status: Final result Specimen: Blood from Arm, Right Updated: 07/11/25 2001    Procalcitonin [[id number]] Collected: 07/11/25 1843    Lab Status: In process Specimen: Blood from Arm, Right Updated: 07/11/25 1957    UA w Reflex to Microscopic w Reflex to Culture [[id number]]     Lab Status: No result Specimen: Urine     CBC and differential  [[id number]]  (Abnormal) Collected: 07/11/25 1843    Lab Status: Final result Specimen: Blood from Arm, Right Updated: 07/11/25 1929     WBC 16.74 Thousand/uL      RBC 4.33 Million/uL      Hemoglobin 13.9 g/dL      Hematocrit 43.6 %       fL      MCH 32.1 pg      MCHC 31.9 g/dL      RDW 13.2 %      MPV 10.3 fL      Platelets 279 Thousands/uL     Narrative:      This is an appended report.  These results have been appended to a previously verified report.    Manual Differential(PHLEBS Do Not Order) [[id number]]  (Abnormal) Collected: 07/11/25 1843    Lab Status: Final result Specimen: Blood from Arm, Right Updated: 07/11/25 1929     Segmented % 52 %      Bands % 3 %      Lymphocytes % 32 %      Monocytes % 7 %      Eosinophils % 4 %      Basophils % 1 %      Myelocytes % 1 %      Absolute Neutrophils 9.21 Thousand/uL      Absolute Lymphocytes 5.36 Thousand/uL      Absolute Monocytes 1.17 Thousand/uL      Absolute Eosinophils 0.67 Thousand/uL      Absolute Basophils 0.17 Thousand/uL      Absolute Myelocytes 0.17 Thousand/uL      Total Counted --     nRBC 1 /100 WBC      RBC Morphology Present     Platelet Estimate Adequate     Large Platelet Present     Avella Cells Present     Ovalocytes Present     Tear Drop Cells Present    Basic metabolic panel [[id number]]  (Abnormal) Collected: 07/11/25 1843    Lab Status: Final result Specimen: Blood from Arm, Right Updated: 07/11/25 1904     Sodium 143 mmol/L      Potassium 4.2 mmol/L      Chloride 106 mmol/L      CO2 24 mmol/L      ANION GAP 13 mmol/L      BUN 25 mg/dL      Creatinine 0.92 mg/dL      Glucose 234 mg/dL      Calcium 9.5 mg/dL      eGFR 51 ml/min/1.73sq m     Narrative:      National Kidney Disease Foundation guidelines for Chronic Kidney Disease (CKD):     Stage 1 with normal or high GFR (GFR > 90 mL/min/1.73 square meters)    Stage 2 Mild CKD (GFR = 60-89 mL/min/1.73 square meters)    Stage 3A Moderate CKD (GFR = 45-59 mL/min/1.73 square meters)    Stage 3B  Moderate CKD (GFR = 30-44 mL/min/1.73 square meters)    Stage 4 Severe CKD (GFR = 15-29 mL/min/1.73 square meters)    Stage 5 End Stage CKD (GFR <15 mL/min/1.73 square meters)  Note: GFR calculation is accurate only with a steady state creatinine    Fingerstick Glucose (POCT) [[id number]]  (Abnormal) Collected: 07/11/25 1836    Lab Status: Final result Specimen: Blood Updated: 07/11/25 1837     POC Glucose 249 mg/dl             XR chest 1 view portable   ED Interpretation by Lucas Marin MD (07/11 2045)   No acute cardiopulmonary disease.  Suction tubing is visible on the left hemithorax.  Independently interpreted by myself.          ECG 12 Lead Documentation Only    Date/Time: 7/11/2025 7:50 PM    Performed by: Lucas Marin MD  Authorized by: Lucas Marin MD    Indications / Diagnosis:  Aspiration  ECG reviewed by me, the ED Provider: yes    Patient location:  ED  Previous ECG:     Previous ECG:  Compared to current    Similarity:  No change  Interpretation:     Interpretation: abnormal    Rate:     ECG rate:  122    ECG rate assessment: tachycardic    Rhythm:     Rhythm: sinus rhythm    Ectopy:     Ectopy: none    QRS:     QRS axis:  Left    QRS intervals:  Normal  Conduction:     Conduction: normal    ST segments:     ST segments:  Normal  T waves:     T waves: normal        ED Medication and Procedure Management   Prior to Admission Medications   Prescriptions Last Dose Informant Patient Reported? Taking?   acetaminophen (TYLENOL) 325 mg tablet   Yes No   Sig: Take 650 mg by mouth every 6 (six) hours as needed for mild pain or fever   aspirin (ECOTRIN LOW STRENGTH) 81 mg EC tablet   Yes No   Sig: Take 81 mg by mouth daily   citalopram (CeleXA) 10 mg tablet   Yes No   Sig: Take 10 mg by mouth daily   clorazepate (TRANXENE) 7.5 mg tablet   Yes No   Sig: Take 7.5 mg by mouth daily   donepezil (ARICEPT) 5 mg tablet   Yes No   Sig: Take 5 mg by mouth every morning   lisinopril (ZESTRIL) 2.5 mg tablet   Yes No   Sig:  Take 2.5 mg by mouth daily   loperamide (IMODIUM) 2 mg capsule   Yes No   Sig: Take 2 mg by mouth 4 (four) times a day as needed for diarrhea   nystatin (MYCOSTATIN) powder   Yes No   Sig: Apply topically 2 (two) times a day as needed For irritation   pravastatin (PRAVACHOL) 40 mg tablet   Yes No   Sig: Take 40 mg by mouth daily   verapamil (VERELAN PM) 120 MG 24 hr capsule   Yes No   Sig: Take 120 mg by mouth every morning      Facility-Administered Medications: None     Current Discharge Medication List        CONTINUE these medications which have NOT CHANGED    Details   acetaminophen (TYLENOL) 325 mg tablet Take 650 mg by mouth every 6 (six) hours as needed for mild pain or fever      aspirin (ECOTRIN LOW STRENGTH) 81 mg EC tablet Take 81 mg by mouth daily      citalopram (CeleXA) 10 mg tablet Take 10 mg by mouth daily      clorazepate (TRANXENE) 7.5 mg tablet Take 7.5 mg by mouth daily      donepezil (ARICEPT) 5 mg tablet Take 5 mg by mouth every morning      lisinopril (ZESTRIL) 2.5 mg tablet Take 2.5 mg by mouth daily      loperamide (IMODIUM) 2 mg capsule Take 2 mg by mouth 4 (four) times a day as needed for diarrhea      nystatin (MYCOSTATIN) powder Apply topically 2 (two) times a day as needed For irritation      pravastatin (PRAVACHOL) 40 mg tablet Take 40 mg by mouth daily      verapamil (VERELAN PM) 120 MG 24 hr capsule Take 120 mg by mouth every morning           No discharge procedures on file.  ED SEPSIS DOCUMENTATION   Time reflects when diagnosis was documented in both MDM as applicable and the Disposition within this note       Time User Action Codes Description Comment    7/11/2025  8:01 PM Lucas Marin Add [T17.908A] Aspiration into airway, initial encounter     7/11/2025  8:01 PM Lucas Marin Add [R09.02] Hypoxia            Initial Sepsis Screening       Row Name 07/11/25 2002 07/11/25 1955             Is the patient's history suggestive of a new or worsening infection? Yes (Proceed)  -AP Yes  (Proceed)  -MT       Suspected source of infection pneumonia  -AP pneumonia  -MT       Indicate SIRS criteria Tachycardia > 90 bpm;Tachypnea > 20 resp per min;Leukocytosis (WBC > 39091 IJL) OR Leukopenia (WBC <4000 IJL) OR Bandemia (WBC >10% bands)  -AP Tachycardia > 90 bpm;Tachypnea > 20 resp per min;Leukocytosis (WBC > 25278 IJL) OR Leukopenia (WBC <4000 IJL) OR Bandemia (WBC >10% bands)  -MT       Are two or more of the above signs & symptoms of infection both present and new to the patient? Yes (Proceed)  -AP --       Assess for evidence of organ dysfunction: Are any of the below criteria present within 6 hours of suspected infection and SIRS criteria that are NOT considered to be chronic conditions? -- --                 User Key  (r) = Recorded By, (t) = Taken By, (c) = Cosigned By      Initials Name Provider Type    MT Joycelyn Mann, DO Physician    AP Lucas Marin MD Resident                           [1] No past medical history on file.  [2] No past surgical history on file.  [3] No family history on file.  [4]   Social History  Tobacco Use    Smoking status: Never    Smokeless tobacco: Never   Substance Use Topics    Alcohol use: Never    Drug use: Never        Lucas Marin MD  07/11/25 2040

## 2025-07-12 PROBLEM — Z71.89 GOALS OF CARE, COUNSELING/DISCUSSION: Status: ACTIVE | Noted: 2025-01-01

## 2025-07-12 NOTE — RESPIRATORY THERAPY NOTE
"RT Protocol Note  Suzan Wiggins 89 y.o. female MRN: [id number]  Unit/Bed#: -01 Encounter: [id number]    Assessment    Active Problems:  There are no active Hospital Problems.      Home Pulmonary Medications:  none       Past Medical History[1]  Social History[2]    Subjective         Objective    Physical Exam:   Assessment Type: Assess only  General Appearance: Eyes open/responds to voice  Respiratory Pattern: Grunting  Chest Assessment: Chest expansion symmetrical  Bilateral Breath Sounds: Diminished  Cough: Non-productive, Weak  O2 Device: BiPAP    Vitals:  Blood pressure 123/57, pulse 92, temperature 97.8 °F (36.6 °C), temperature source Axillary, resp. rate (!) 38, height 4' 8\" (1.422 m), weight 69.9 kg (154 lb 1.6 oz), SpO2 94%.          Imaging and other studies: Results Review Statement: No pertinent imaging studies reviewed.    O2 Device: BiPAP     Plan             Resp Comments: (P) Patient is currently on BiPAP not answering any questions at this time, per patients chart patient does not take any respiratory medication and does not have any respiratory hx        [1] No past medical history on file.  [2]   Social History  Socioeconomic History    Marital status:    Tobacco Use    Smoking status: Never    Smokeless tobacco: Never   Substance and Sexual Activity    Alcohol use: Never    Drug use: Never     "

## 2025-07-12 NOTE — PLAN OF CARE
Problem: Potential for Falls  Goal: Patient will remain free of falls  Description: INTERVENTIONS:  - Educate patient/family on patient safety including physical limitations  - Instruct patient to call for assistance with activity   - Consider consulting OT/PT to assist with strengthening/mobility based on AM PAC & -HLM score  - Consult OT/PT to assist with strengthening/mobility   - Keep Call bell within reach  - Keep bed low and locked with side rails adjusted as appropriate  - Keep care items and personal belongings within reach  - Initiate and maintain comfort rounds  - Make Fall Risk Sign visible to staff  - Offer Toileting every  Hours, in advance of need  - Initiate/Maintain alarm  - Obtain necessary fall risk management equipment:   - Apply yellow socks and bracelet for high fall risk patients  - Consider moving patient to room near nurses station  Outcome: Progressing     Problem: Prexisting or High Potential for Compromised Skin Integrity  Goal: Skin integrity is maintained or improved  Description: INTERVENTIONS:  - Identify patients at risk for skin breakdown  - Assess and monitor skin integrity including under and around medical devices   - Assess and monitor nutrition and hydration status  - Monitor labs  - Assess for incontinence   - Turn and reposition patient  - Assist with mobility/ambulation  - Relieve pressure over tristin prominences   - Avoid friction and shearing  - Provide appropriate hygiene as needed including keeping skin clean and dry  - Evaluate need for skin moisturizer/barrier cream  - Collaborate with interdisciplinary team  - Patient/family teaching  - Consider wound care consult    Assess:  - Review Nik scale daily  - Clean and moisturize skin every   - Inspect skin when repositioning, toileting, and assisting with ADLS  - Assess under medical devices such as  every   - Assess extremities for adequate circulation and sensation     Bed Management:  - Have minimal linens on bed &  keep smooth, unwrinkled  - Change linens as needed when moist or perspiring  - Avoid sitting or lying in one position for more than  hours while in bed?Keep HOB at degrees   - Toileting:  - Offer bedside commode  - Assess for incontinence every   - Use incontinent care products after each incontinent episode such as     Activity:  - Mobilize patient  times a day  - Encourage activity and walks on unit  - Encourage or provide ROM exercises   - Turn and reposition patient every  Hours  - Use appropriate equipment to lift or move patient in bed  - Instruct/ Assist with weight shifting every  when out of bed in chair  - Consider limitation of chair time  hour intervals    Skin Care:  - Avoid use of baby powder, tape, friction and shearing, hot water or constrictive clothing  - Relieve pressure over bony prominences using   - Do not massage red bony areas    Next Steps:  - Teach patient strategies to minimize risks such as   - Consider consults to  interdisciplinary teams such a  Outcome: Progressing     Problem: CARDIOVASCULAR - ADULT  Goal: Maintains optimal cardiac output and hemodynamic stability  Description: INTERVENTIONS:  - Monitor I/O, vital signs and rhythm  - Monitor for S/S and trends of decreased cardiac output  - Administer and titrate ordered vasoactive medications to optimize hemodynamic stability  - Assess quality of pulses, skin color and temperature  - Assess for signs of decreased coronary artery perfusion  - Instruct patient to report change in severity of symptoms  Outcome: Progressing  Goal: Absence of cardiac dysrhythmias or at baseline rhythm  Description: INTERVENTIONS:  - Continuous cardiac monitoring, vital signs, obtain 12 lead EKG if ordered  - Administer antiarrhythmic and heart rate control medications as ordered  - Monitor electrolytes and administer replacement therapy as ordered  Outcome: Progressing     Problem: RESPIRATORY - ADULT  Goal: Achieves optimal ventilation and  oxygenation  Description: INTERVENTIONS:  - Assess for changes in respiratory status  - Assess for changes in mentation and behavior  - Position to facilitate oxygenation and minimize respiratory effort  - Oxygen administered by appropriate delivery if ordered  - Initiate smoking cessation education as indicated  - Encourage broncho-pulmonary hygiene including cough, deep breathe, Incentive Spirometry  - Assess the need for suctioning and aspirate as needed  - Assess and instruct to report SOB or any respiratory difficulty  - Respiratory Therapy support as indicated  Outcome: Progressing

## 2025-07-12 NOTE — ASSESSMENT & PLAN NOTE
PTA regimen: Celexa 20 mg daily, Ativan 0.5 mg q8hrs PRN for anxiety, Seroquel 50 mg BID, hyoscyamine SL 0.125 mg q6hrs PRN for excessive secretions.  D/C Celexa and Seroquel with transition to hospice

## 2025-07-12 NOTE — ASSESSMENT & PLAN NOTE
Patient with known history of dementia, son reports patient's clinical status has been declining over the last year. Reports being on hospice 2 weeks ago, previously was under comfort measures off and on at LTC at [facility].  Patient was previously under do not hospitalize orders while DNR/DNI, however son changed his mind when being evaluated in the ED to try treating pneumonia.  Concern for patient's clinical status to continue declining given advanced age, dementia, somnolence/lethargy  Discussed with patient's son, he reinforced DNR/DNI status. I also explained that BiPAP at this point is not an appropriate option given the aspiration event & clinical status did not significantly improve after initial BiPAP trial.  I also suspect the IV antibiotics are providing minimal benefit given clinical status this morning.  Long goals of care discussion with patient's son, ultimately decided to transition to level 4 comfort care & consult hospice services  Comfort measures with PRN: Tylenol suppository, IV Ativan, PO solution oxycodone, IV Dilaudid, PO/IV Zofran, SL hyoscyamine, Dulcolax suppository.

## 2025-07-12 NOTE — ASSESSMENT & PLAN NOTE
EKG on admission concerning for new onset atrial fibrillation with RVR,  on arrival. No hx A-fib per record review.  Likely brought on by aspiration pneumonia/pneumonitis  Initially placed on PRN IV Lopressor for HR spikes  Hold off on anticoagulation 2/2 risk of bleeding/falls  D/C telemetry monitoring with transition to hospice, per review in A-fib with HR was more stable in low 100s.

## 2025-07-12 NOTE — H&P
H&P - Hospitalist   Name: Suzan Wiggins 89 y.o. female I MRN: [id number]  Unit/Bed#: -01 I Date of Admission: 7/11/2025   Date of Service: 7/11/2025 I Hospital Day: 0     Assessment & Plan  Aspiration pneumonia (HCC)  Severe sepsis (HCC)  Presented with tachycardia/tachypnea and leukocytosis along with hypoxic respiratory failure in the setting of aspiration pneumonia  Continue IV Unasyn  Monitor vitals and maintain hemodynamics -> goal MAP > 65 -> currently on IV fluid infusion  Await blood cultures - await urine Legionella/Streptococcus Ag and sputum culture- await MRSA swab  Influenza/COVID screen negative  Pending speech therapy evaluation - currently NPO - aspiration precautions  Supportive care otherwise  Acute respiratory failure with hypoxia (HCC)  Secondary to aspiration pneumonia (see above)  Initially trialed on nonrebreather but now requiring BiPAP support  Wean down requirements, as tolerated  Dementia (HCC)  Holding Aricept/Celexa while NPO  Hyperlipidemia  Holding statin while NPO  Essential hypertension  Hold oral antihypertensives while NPO  PRN IV Lopressor on board for BP spikes  Atrial fibrillation (HCC)  Mild RVR on presentation likely triggered by sepsis/infection - EKG noted   Unclear if new onset or prior history due to lack of prior records currently  PRN IV Lopressor on board for HR spikes  Not on chronic anticoagulation      .VTE Pharmacologic Prophylaxis: VTE Score: 6 High Risk (Score >/= 5) - Pharmacological DVT Prophylaxis Ordered: Heparin. Sequential Compression Devices Ordered.    Code Status: Level 3 - DNAR and DNI    Discussion with:  Patient at bedside - ED provider discussed with son, regarding goals of care    Anticipated Length of Stay:  Patient will be admitted on an Inpatient basis with an anticipated length of stay of greater than 2 midnights.   Justification for Hospital Stay: Severe sepsis due to aspiration pneumonia requiring hemodynamic monitoring, ambulation, and  infectious workup, along with hypoxic respiratory failure requiring BiPAP support currently.    Total Time for Visit (including Counseling & Coordination of Care):  78 minutes. More than 50% of total time spent on counseling and coordination of care, on one or more of the following: performing physical exam; counseling and coordination of care; obtaining or reviewing history; documenting in the medical record; reviewing/ordering tests, medications or procedures; communicating with other healthcare professionals and discussing with patient's family/caregivers.      History of Present Illness    Chief Complaint:  Aspiration episode    Suzan Wiggins is a 89 y.o. female who presents from her nursing facility after sustaining an aspiration episode, requiring placement of a nonrebreather and suction of food contents done in the ED.  After suctioning, she was initially placed back on a nonrebreather and ultimately required BiPAP placement.  She met severe sepsis criteria and was initiated on an IV Unasyn course for aspiration pneumonia.  Due to recurrent mentation and underlying dementia, patient herself is a poor historian.  A conversation occurred between the ED provider and son with plan for antibiotic treatment, but patient is to remain a DNR/DNI.  It was agreed that if her condition worsened during hospital course, son would consider transition to hospice.    Review of Systems:  A thorough 12 point review systems was conducted.  Pertinent positives and negatives are mentioned in the history of present illness.      Past Medical & Surgical History    Past Medical History[1]    Past Surgical History[2]      Medications:   Prior to Admission medications    Medication Sig Start Date End Date Taking? Authorizing Provider   acetaminophen (TYLENOL) 325 mg tablet Take 650 mg by mouth every 6 (six) hours as needed for mild pain or fever    Historical Provider, MD   aspirin (ECOTRIN LOW STRENGTH) 81 mg EC tablet Take 81 mg by  "mouth daily    Historical Provider, MD   citalopram (CeleXA) 10 mg tablet Take 10 mg by mouth daily    Historical Provider, MD   clorazepate (TRANXENE) 7.5 mg tablet Take 7.5 mg by mouth daily    Historical Provider, MD   donepezil (ARICEPT) 5 mg tablet Take 5 mg by mouth every morning    Historical Provider, MD   lisinopril (ZESTRIL) 2.5 mg tablet Take 2.5 mg by mouth daily    Historical Provider, MD   loperamide (IMODIUM) 2 mg capsule Take 2 mg by mouth 4 (four) times a day as needed for diarrhea    Historical Provider, MD   nystatin (MYCOSTATIN) powder Apply topically 2 (two) times a day as needed For irritation    Historical Provider, MD   pravastatin (PRAVACHOL) 40 mg tablet Take 40 mg by mouth daily    Historical Provider, MD   verapamil (VERELAN PM) 120 MG 24 hr capsule Take 120 mg by mouth every morning    Historical Provider, MD       Allergies: Allergies[3]      Social & Family History    Social History     Substance and Sexual Activity   Alcohol Use Never     Tobacco Use History[4]  Social History     Substance and Sexual Activity   Drug Use Never       Family History[5]      Objective     Vitals:   Blood Pressure: 126/52 (07/11/25 2225)  Pulse: 88 (07/11/25 2225)  Temperature: 97.8 °F (36.6 °C) (07/11/25 1833)  Temp Source: Axillary (07/11/25 1833)  Respirations: (!) 32 (07/11/25 2225)  Height: 4' 8\" (142.2 cm) (07/11/25 1947)  Weight - Scale: 69.9 kg (154 lb 1.6 oz) (07/11/25 1934)  SpO2: 94 % (07/11/25 2225)      Physical Exam:    GENERAL Weak/fatigued   HEAD   Normocephalic - atraumatic   EYES Nonicteric   MOUTH   Mucosa moist   NECK Supple without adenopathy   CARDIAC Tachycardic   PULMONARY Diminished bilaterally - mildly labored breathing requiring BiPAP   ABDOMEN   Soft - nontender/nondistended - active bowel sounds   MUSCULOSKELETAL   Motor strength/range of motion deconditioned   NEUROLOGIC Encephalopathic   PSYCHIATRIC   Mood/affect mildly anxious         Labs & Recent Cultures:  Results from " last 7 days   Lab Units 07/11/25  1843   WBC Thousand/uL 16.74*   HEMOGLOBIN g/dL 13.9   HEMATOCRIT % 43.6   PLATELETS Thousands/uL 279   BANDS PCT % 3   LYMPHO PCT % 32   MONO PCT % 7   EOS PCT % 4     Results from last 7 days   Lab Units 07/11/25  1843   SODIUM mmol/L 143  143   POTASSIUM mmol/L 4.2  4.2   CHLORIDE mmol/L 106  106   CO2 mmol/L 24  24   BUN mg/dL 25  25   CREATININE mg/dL 0.92  0.92   ANION GAP mmol/L 13  13   CALCIUM mg/dL 9.5  9.5   ALBUMIN g/dL 4.1   TOTAL BILIRUBIN mg/dL 0.74   ALK PHOS U/L 56   ALT U/L 49   AST U/L 46*   GLUCOSE RANDOM mg/dL 234*  234*     Results from last 7 days   Lab Units 07/11/25  1958   INR  1.07     Results from last 7 days   Lab Units 07/11/25  1836   POC GLUCOSE mg/dl 249*         Results from last 7 days   Lab Units 07/11/25 2003 07/11/25  1843   LACTIC ACID mmol/L 1.9  --    PROCALCITONIN ng/ml  --  0.07                 Lines/Drains:  Invasive Devices       Peripheral Intravenous Line  Duration             Peripheral IV 07/11/25 Right Antecubital <1 day                                GILDA PARKER MD   Hospitalist - [facility]        ** Please Note:  Documentation is constructed using a voice recognition dictation system.  An occasional wrong word/phrase or “sound-a-like” substitution may have been picked up by dictation device due to the inherent limitations of voice recognition software.  Read the chart carefully and recognize, using reasonable context, where substitutions may have occurred.**        [1] No past medical history on file.  [2] No past surgical history on file.  [3]   Allergies  Allergen Reactions    Tetanus Toxoid Other (See Comments)     Unsure of reaction   [4]   Social History  Tobacco Use   Smoking Status Never   Smokeless Tobacco Never   [5] No family history on file.

## 2025-07-12 NOTE — HOSPICE NOTE
Received referral, patient approved for hospice provided at home or SNF. Left VM for son Yovani to discuss. Will wait for return call.

## 2025-07-12 NOTE — ASSESSMENT & PLAN NOTE
SIRS criteria met on admission AEB tachycardia, tachypnea, leukocytosis. Source: Aspiration pneumonia versus pneumonitis. End-organ dysfunction: Acute respiratory failure requiring BiPAP. Patient initially presented from [facility] after aspiration event.   CXR not yet formally read, no overt consolidation but not patient did not have great positioning  UA not overtly suggestive of UTI, with positive nitrites & innumerable bacteria but only 10-20 WBCs.   Procal: 0.07->0.43. BC x2: Pending. Lactic acid wnl.   MRSA nares cx: Pending. Legionella/strep pneumonia antigens: Pending. COVID/flu antigen: Negative.  Received IV Unasyn in ED, placed on continuous IVFs overnight while NPO with lethargy  D/C further IV antibiotics & IVFs with transition to hospice, patient's son is in agreement

## 2025-07-12 NOTE — ASSESSMENT & PLAN NOTE
Presented with tachycardia/tachypnea and leukocytosis along with hypoxic respiratory failure in the setting of aspiration pneumonia  Continue IV Unasyn  Monitor vitals and maintain hemodynamics -> goal MAP > 65 -> currently on IV fluid infusion  Await blood cultures - await urine Legionella/Streptococcus Ag and sputum culture- await MRSA swab  Influenza/COVID screen negative  Pending speech therapy evaluation - currently NPO - aspiration precautions  Supportive care otherwise

## 2025-07-12 NOTE — ASSESSMENT & PLAN NOTE
Mild RVR on presentation likely triggered by sepsis/infection - EKG noted   Unclear if new onset or prior history due to lack of prior records currently  PRN IV Lopressor on board for HR spikes  Not on chronic anticoagulation

## 2025-07-12 NOTE — Clinical Note
89 YO at [facility], [facility], presented to ED after sustaining an aspiration episode  She required Non-rebreather to Bipap initially following removal of food in lungs. Currently 5LPM NC meeting severe sepsis criteria, with antibiotics started. Patient hypoxic and acute hypoxic respiratory failure in the ICU currently  Patient is lethargic, ambulatory dysfunction, DNR/DNI at facility, PPS 20, Labs WNL, not requiring any pain meds, had 1 dose of IV Ativan today for agitation  PMH: Dementia ( alert previously and oriented to person only)  [facility] discussed with son, he wants to continue DNR/DNI and comfort care only, did not want facility to take her to the hospital initially  Approve RLOC? Price (Use Numbers Only, No Special Characters Or $): 75 Post-Care Instructions: I reviewed with the patient in detail post-care instructions. Patient is to wear sunprotection, and avoid picking at any of the treated lesions. Pt may apply Vaseline to crusted or scabbing areas. Anesthesia Volume In Cc: 0 Detail Level: Simple Consent: The patient's consent was obtained including but not limited to risks of crusting, scabbing, blistering, scarring, darker or lighter pigmentary change, recurrence, incomplete removal and infection.

## 2025-07-12 NOTE — ASSESSMENT & PLAN NOTE
SIRS criteria met on admission AEB tachycardia, tachypnea, leukocytosis. Source: Aspiration pneumonia versus pneumonitis. End-organ dysfunction: Acute respiratory failure requiring BiPAP. Patient initially presented from [facility] after aspiration event.   CXR not yet formally read, no overt consolidation but not patient did not have great positioning  UA not overtly suggestive of UTI, with positive nitrites & innumerable bacteria but only 10-20 WBCs.   Procal: 0.07->0.43. BC x2: Pending. Lactic acid wnl.   MRSA nares cx: Pending. Legionella/strep pneumonia antigens: Pending. COVID/flu antigen: Negative.  Received IV Unasyn in ED, placed on continuous IVFs overnight while NPO with lethargy  D/C further IV antibiotics & IVFs with transition to hospice, patient's son is in agreement

## 2025-07-12 NOTE — ASSESSMENT & PLAN NOTE
Patient brought in from SNF in acute respiratory distress after aspiration event. Reportedly patient is supposed to be eating soft foods, however was eating cherry cobbler and aspirated.  Patient initially on NRB, ED was able to suction some food from her airway but was still with significant tachypnea and subsequently was placed on BiPAP  Patient was transitioned off BiPAP to 5L NC O2 earlier this morning, however still tachypneic with increased WOB  Discussed with attending, would avoid placing patient back on BiPAP given minimal improvement & risk of further aspiration outweighs benefit at this point.  D/C BiPAP, can be on up to 5L NC O2 PRN for comfort

## 2025-07-12 NOTE — PROGRESS NOTES
Patient:    MRN:  [id number]    Aidin Request ID:  [id number]    Level of care reserved:  Hospice    Partner Reserved:  [facility], [place] [address] [phone]    Clinical needs requested:    Geography searched:  [phone]    Start of Service:    Request sent:  9:32am EDT on 7/12/2025 by Filipe Felipe    Partner reserved:  4:20pm EDT on 7/12/2025 by Filipe Felipe    Choice list shared:  4:19pm EDT on 7/12/2025 by Filipe Felipe

## 2025-07-12 NOTE — CASE MANAGEMENT
Case Management Progress Note    Patient name Suzan Wiggins  Location /-01 MRN [id number]  : 3/28/1926 Date 2025       LOS (days): 1  Geometric Mean LOS (GMLOS) (days):   Days to GMLOS:        OBJECTIVE:        Current admission status: Inpatient  Preferred Pharmacy:   [facility] ([place] - [place] - [address]  [address] [place] [address]  Phone: [phone] Fax: [phone]    Primary Care Provider: No primary care provider on file.    Primary Insurance: MEDICARE  Secondary Insurance:     PROGRESS NOTE:  CM was in contact with Pt's son Yovani [phone] regarding a CM consult for hospice care which Yovani confirmed and requested a referral to  Hospice. CM made the requested hospice referral to  Hospice and notified the Liaision [facility].

## 2025-07-12 NOTE — PROGRESS NOTES
Progress Note - Hospitalist   Name: Suzan Wiggins 89 y.o. female I MRN: [id number]  Unit/Bed#: -01 I Date of Admission: 7/11/2025   Date of Service: 7/12/2025 I Hospital Day: 1    Assessment & Plan  Goals of care, counseling/discussion  Patient with known history of dementia, son reports patient's clinical status has been declining over the last year. Reports being on hospice 2 weeks ago, previously was under comfort measures off and on at [facility].  Patient was previously under do not hospitalize orders while DNR/DNI, however son changed his mind when being evaluated in the ED to try treating pneumonia.  Concern for patient's clinical status to continue declining given advanced age, dementia, somnolence/lethargy  Discussed with patient's son, he reinforced DNR/DNI status. I also explained that BiPAP at this point is not an appropriate option given the aspiration event & clinical status did not significantly improve after initial BiPAP trial.  I also suspect the IV antibiotics are providing minimal benefit given clinical status this morning.  Long goals of care discussion with patient's son, ultimately decided to transition to level 4 comfort care & consult hospice services  Comfort measures with PRN: Tylenol suppository, IV Ativan, PO solution oxycodone, IV Dilaudid, PO/IV Zofran, SL hyoscyamine, Dulcolax suppository.  Aspiration pneumonia (HCC)  Severe sepsis (HCC)  SIRS criteria met on admission AEB tachycardia, tachypnea, leukocytosis. Source: Aspiration pneumonia versus pneumonitis. End-organ dysfunction: Acute respiratory failure requiring BiPAP. Patient initially presented from [facility] after aspiration event.   CXR not yet formally read, no overt consolidation but not patient did not have great positioning  UA not overtly suggestive of UTI, with positive nitrites & innumerable bacteria but only 10-20 WBCs.   Procal: 0.07->0.43. BC x2: Pending. Lactic acid wnl.   MRSA nares cx: Pending.  Legionella/strep pneumonia antigens: Pending. COVID/flu antigen: Negative.  Received IV Unasyn in ED, placed on continuous IVFs overnight while NPO with lethargy  D/C further IV antibiotics & IVFs with transition to hospice, patient's son is in agreement  Acute respiratory failure with hypoxia (HCC)  Patient brought in from SNF in acute respiratory distress after aspiration event. Reportedly patient is supposed to be eating soft foods, however was eating cherry cobbler and aspirated.  Patient initially on NRB, ED was able to suction some food from her airway but was still with significant tachypnea and subsequently was placed on BiPAP  Patient was transitioned off BiPAP to 5L NC O2 earlier this morning, however still tachypneic with increased WOB  Discussed with attending, would avoid placing patient back on BiPAP given minimal improvement & risk of further aspiration outweighs benefit at this point.  D/C BiPAP, can be on up to 5L NC O2 PRN for comfort  Atrial fibrillation (HCC)  EKG on admission concerning for new onset atrial fibrillation with RVR,  on arrival. No hx A-fib per record review.  Likely brought on by aspiration pneumonia/pneumonitis  Initially placed on PRN IV Lopressor for HR spikes  Hold off on anticoagulation 2/2 risk of bleeding/falls  D/C telemetry monitoring with transition to hospice, per review in A-fib with HR was more stable in low 100s.  Dementia (HCC)  PTA regimen: Celexa 20 mg daily, Ativan 0.5 mg q8hrs PRN for anxiety, Seroquel 50 mg BID, hyoscyamine SL 0.125 mg q6hrs PRN for excessive secretions.  D/C Celexa and Seroquel with transition to hospice  Hyperlipidemia  Patient no longer taking statin per SNF medication review  Essential hypertension  PTA regimen: Lisinopril 2.5 mg daily.   BP reviewed, currently acceptable  D/C lisinopril with transition to hospice    VTE Pharmacologic Prophylaxis: VTE Score: 6 N/A, patient on comfort measures    Mobility:   Basic Mobility Inpatient  Raw Score: 6  JH-HLM Goal: 2: Bed activities/Dependent transfer  JH-HLM Achieved: 1: Laying in bed  JH-HLM Goal achieved. Continue to encourage appropriate mobility.    Patient Centered Rounds: I performed bedside rounds with nursing staff today.   Discussions with Specialists or Other Care Team Provider: Case management/hospice    Education and Discussions with Family / Patient: Updated  (son) via phone.    Current Length of Stay: 1 day(s)  Current Patient Status: Inpatient   Certification Statement: The patient will continue to require additional inpatient hospital stay due to hospice evaluation, safe discharge planning  Discharge Plan: Anticipate discharge in 24-48 hrs to rehab facility.    Code Status: Level 4 - Comfort Care    Subjective   Patient is seen at bedside this a.m., noted to be in respiratory distress with persistent tachypnea and dyspnea. Patient's somnolent, only slightly awakened moaning/groaning with sternal rub but unable to answer any questions appropriately.    Objective :  Temp:  [97.8 °F (36.6 °C)-98.2 °F (36.8 °C)] 97.8 °F (36.6 °C)  HR:  [] 93  BP: (105-170)/() 131/88  Resp:  [20-38] 20  SpO2:  [85 %-98 %] 96 %  O2 Device: Nasal cannula  Nasal Cannula O2 Flow Rate (L/min):  [5 L/min] 5 L/min  FiO2 (%):  [60] 60    Body mass index is 34.55 kg/m².     Input and Output Summary (last 24 hours):     Intake/Output Summary (Last 24 hours) at 7/12/2025 1309  Last data filed at 7/12/2025 0430  Gross per 24 hour   Intake --   Output 100 ml   Net -100 ml       Physical Exam  Constitutional:       General: She is in acute distress.      Appearance: She is ill-appearing. She is not toxic-appearing or diaphoretic.     Cardiovascular:      Rate and Rhythm: Tachycardia present. Rhythm irregular.      Pulses: Normal pulses.      Heart sounds: Normal heart sounds.   Pulmonary:      Effort: Respiratory distress present.      Breath sounds: Rhonchi present. No wheezing or rales.       Comments: Increased WOB/dyspneic with use of accessory muscles, tachypneic.  Abdominal:      General: There is no distension.      Palpations: Abdomen is soft.      Tenderness: There is no abdominal tenderness. There is no guarding.      Comments: No grimacing of face with palpation of abdomen     Musculoskeletal:         General: No swelling or tenderness.     Skin:     General: Skin is warm.     Neurological:      General: No focal deficit present.      Comments: Somnolent, moans/groans and open eyes with sternal rub.   Psychiatric:         Mood and Affect: Mood normal.         Behavior: Behavior normal.           Lines/Drains:  Lines/Drains/Airways       Active Status       Name Placement date Placement time Site Days    External Urinary Catheter 07/12/25  0630  -- less than 1                      Telemetry:  Telemetry Orders (From admission, onward)               24 Hour Telemetry Monitoring  Continuous x 24 Hours (Telem)        Expiring   Question:  Reason for 24 Hour Telemetry  Answer:  Arrhythmias requiring acute medical intervention / PPM or ICD malfunction                     Telemetry Reviewed: Atrial fibrillation. HR averaging 100  Indication for Continued Telemetry Use: No indication for continued use. Will discontinue.                Lab Results: I have reviewed the following results:   Results from last 7 days   Lab Units 07/12/25  0527   WBC Thousand/uL 19.30*   HEMOGLOBIN g/dL 13.1   HEMATOCRIT % 42.1   PLATELETS Thousands/uL 183   BANDS PCT % 10*   LYMPHO PCT % 3*   MONO PCT % 3*   EOS PCT % 0     Results from last 7 days   Lab Units 07/12/25  0527   SODIUM mmol/L 142   POTASSIUM mmol/L 4.2   CHLORIDE mmol/L 108   CO2 mmol/L 22   BUN mg/dL 26*   CREATININE mg/dL 0.87   ANION GAP mmol/L 12   CALCIUM mg/dL 9.0   ALBUMIN g/dL 3.7   TOTAL BILIRUBIN mg/dL 0.86   ALK PHOS U/L 50   ALT U/L 49   AST U/L 52*   GLUCOSE RANDOM mg/dL 219*     Results from last 7 days   Lab Units 07/11/25  1958   INR  1.07      Results from last 7 days   Lab Units 07/12/25  0559 07/12/25  0029 07/11/25  1836   POC GLUCOSE mg/dl 233* 233* 249*         Results from last 7 days   Lab Units 07/12/25  0527 07/11/25 2003 07/11/25  1843   LACTIC ACID mmol/L  --  1.9  --    PROCALCITONIN ng/ml 0.43*  --  0.07       Recent Cultures (last 7 days):   Results from last 7 days   Lab Units 07/11/25 2005 07/11/25 2003   BLOOD CULTURE  Received in Microbiology Lab. Culture in Progress. Received in Microbiology Lab. Culture in Progress.       Imaging Results Review: I reviewed radiology reports from this admission including: chest xray.  Other Study Results Review: EKG was reviewed.     Last 24 Hours Medication List:     Current Facility-Administered Medications:     acetaminophen (TYLENOL) rectal suppository 325 mg, Q4H PRN    bisacodyl (DULCOLAX) rectal suppository 10 mg, Daily PRN    HYDROmorphone (DILAUDID) injection 0.3 mg, Q3H PRN    hyoscyamine (LEVSIN/SL) SL tablet 0.125 mg, Q6H PRN    LORazepam (ATIVAN) injection 0.5 mg, Q6H PRN    ondansetron (ZOFRAN) injection 4 mg, Q4H PRN    ondansetron (ZOFRAN-ODT) dispersible tablet 4 mg, Q4H PRN    oxyCODONE (ROXICODONE) oral solution 5 mg, Q4H PRN    Administrative Statements   Today, Patient Was Seen By: Lela Caruso PA-C  I have spent a total time of 55 minutes in caring for this patient on the day of the visit/encounter including Risks and benefits of tx options, Patient and family education, Counseling / Coordination of care, Documenting in the medical record, Reviewing/placing orders in the medical record (including tests, medications, and/or procedures), Obtaining or reviewing history  , and Communicating with other healthcare professionals .    **Please Note: This note may have been constructed using a voice recognition system.**

## 2025-07-12 NOTE — ASSESSMENT & PLAN NOTE
Addended by: AIDAN SPARROW on: 4/4/2019 05:39 PM     Modules accepted: Orders     Secondary to aspiration pneumonia (see above)  Initially trialed on nonrebreather but now requiring BiPAP support  Wean down requirements, as tolerated

## 2025-07-12 NOTE — ASSESSMENT & PLAN NOTE
PTA regimen: Lisinopril 2.5 mg daily.   BP reviewed, currently acceptable  D/C lisinopril with transition to hospice

## 2025-07-13 NOTE — ASSESSMENT & PLAN NOTE
SIRS criteria met on admission AEB tachycardia, tachypnea, leukocytosis. Source: Aspiration pneumonia versus pneumonitis. End-organ dysfunction: Acute respiratory failure requiring BiPAP. Patient initially presented from [facility] after aspiration event.   CXR not yet formally read, no overt consolidation but not patient did not have great positioning  UA not overtly suggestive of UTI, with positive nitrites & innumerable bacteria but only 10-20 WBCs.   Procal: 0.07->0.43. BC x2: Pending. Lactic acid wnl.   MRSA nares cx: Pending. Legionella/strep pneumonia antigens: Pending. COVID/flu antigen: Negative.  Received IV Unasyn in ED, placed on continuous IVFs overnight while NPO with lethargy  D/C further IV antibiotics & IVFs with transition to hospice, patient's son is in agreement

## 2025-07-13 NOTE — ASSESSMENT & PLAN NOTE
Patient initially presented from LTC at [facility] after aspiration event. SIRS criteria met on admission AEB tachycardia, tachypnea, leukocytosis. Source: Aspiration pneumonia vs pneumonitis. End-organ dysfunction: Acute respiratory failure initially requiring BiPAP.  CXR showing mild patchy bilateral groundglass opacity  UA not overtly suggestive of UTI, with positive nitrites & innumerable bacteria but only 10-20 WBCs.   Procal: 0.07->0.43. BC 1/2: GPC, coagulase-negative staph identified & is likely contaminant. Lactic acid wnl.   MRSA nares cx: Pending. Legionella/strep pneumonia antigens: Negative. COVID/flu antigen: Negative.  Received 2 doses IV Unasyn, initially on IVFs  D/C further antibiotics/IVFs with transition to hospice

## 2025-07-13 NOTE — PROGRESS NOTES
Progress Note - Hospitalist   Name: Suzan Wiggins 89 y.o. female I MRN: [id number]  Unit/Bed#: -01 I Date of Admission: 7/11/2025   Date of Service: 7/13/2025 I Hospital Day: 2    Assessment & Plan  Goals of care, counseling/discussion  Patient with known history of dementia, son reports patient's clinical status has been declining over the last year. Reports being on hospice 2 weeks ago, previously was under comfort measures off and on at [facility].  Patient was previously under do not hospitalize orders while DNR/DNI, however son changed his mind when being evaluated in the ED to try treating pneumonia.  Concern for patient's clinical status to continue declining given advanced age, dementia, somnolence/lethargy  Discussed with patient's son, he reinforced DNR/DNI status. I also explained that BiPAP at this point is not an appropriate option given the aspiration event & clinical status did not significantly improve after initial BiPAP trial.  I also suspect the IV antibiotics are providing minimal benefit given clinical status this morning.  Long goals of care discussion with patient's son, ultimately decided to transition to level 4 comfort care & consult hospice services to discuss options  Comfort measures with PRN: Tylenol suppository, IV Ativan, PO solution oxycodone, IV Dilaudid, PO/IV Zofran, SL hyoscyamine, Dulcolax suppository.  Aspiration pneumonia (HCC)  Severe sepsis (HCC)  SIRS criteria met on admission AEB tachycardia, tachypnea, leukocytosis. Source: Aspiration pneumonia versus pneumonitis. End-organ dysfunction: Acute respiratory failure requiring BiPAP. Patient initially presented from [facility] after aspiration event.   CXR not yet formally read, no overt consolidation but not patient did not have great positioning  UA not overtly suggestive of UTI, with positive nitrites & innumerable bacteria but only 10-20 WBCs.   Procal: 0.07->0.43. BC x2: Pending. Lactic acid wnl.   MRSA  nares cx: Pending. Legionella/strep pneumonia antigens: Pending. COVID/flu antigen: Negative.  Received IV Unasyn in ED, placed on continuous IVFs overnight while NPO with lethargy  D/C further IV antibiotics & IVFs with transition to hospice, patient's son is in agreement  Acute respiratory failure with hypoxia (HCC)  Patient brought in from SNF in acute respiratory distress after aspiration event. Reportedly patient is supposed to be eating soft foods, however was eating cherry cobbler and aspirated.  Patient initially on NRB, ED was able to suction some food from her airway but was still with significant tachypnea and subsequently was placed on BiPAP  Patient was transitioned off BiPAP to 5L NC O2 earlier this morning, however still tachypneic with increased WOB  Discussed with attending, would avoid placing patient back on BiPAP given minimal improvement & risk of further aspiration outweighs benefit at this point.  D/C BiPAP, can be on up to 5L NC O2 PRN for comfort  Atrial fibrillation (HCC)  EKG on admission concerning for new onset atrial fibrillation with RVR,  on arrival. No hx A-fib per record review.  Likely brought on by aspiration pneumonia/pneumonitis  Initially placed on PRN IV Lopressor for HR spikes  Hold off on anticoagulation 2/2 risk of bleeding/falls  D/C telemetry monitoring with transition to hospice, per review in A-fib with HR was more stable in low 100s.  Dementia (HCC)  PTA regimen: Celexa 20 mg daily, Ativan 0.5 mg q8hrs PRN for anxiety, Seroquel 50 mg BID, hyoscyamine SL 0.125 mg q6hrs PRN for excessive secretions.  D/C Celexa and Seroquel with transition to hospice  Hyperlipidemia  Patient no longer taking statin per SNF medication review  Essential hypertension  PTA regimen: Lisinopril 2.5 mg daily.   BP reviewed, currently acceptable  D/C lisinopril with transition to hospice    VTE Pharmacologic Prophylaxis: VTE Score: 6 N/A, on comfort measures    Mobility:   Basic Mobility  Inpatient Raw Score: 6  JH-HLM Goal: 2: Bed activities/Dependent transfer  JH-HLM Achieved: 1: Laying in bed  JH-HLM Goal NOT achieved. Continue with multidisciplinary rounding and encourage appropriate mobility to improve upon JH-HLM goals.    Patient Centered Rounds: I performed bedside rounds with nursing staff today.   Discussions with Specialists or Other Care Team Provider: Case management, hospice    Education and Discussions with Family / Patient: Updated  (son) via phone.    Current Length of Stay: 2 day(s)  Current Patient Status: Inpatient   Certification Statement: The patient will continue to require additional inpatient hospital stay due to hospice evaluation, safe discharge planning  Discharge Plan: Anticipate discharge later today or tomorrow to rehab versus hospice house    Code Status: Level 4 - Comfort Care    Subjective   Patient seen at bedside this a.m., awake with eyes open appearing comfortable but not verbally responding to any questions asked. Nursing reported that patient was not awake/alert enough to attempt to give oxycodone oral solution overnight and required IV Dilaudid and IV Ativan for dyspnea and agitation.    Objective :  Temp:  [99.9 °F (37.7 °C)-100.2 °F (37.9 °C)] 100 °F (37.8 °C)  HR:  [96-99] 96  BP: (140)/(81) 140/81  Resp:  [16-22] 16  SpO2:  [90 %-96 %] 90 %  O2 Device: Nasal cannula  Nasal Cannula O2 Flow Rate (L/min):  [5 L/min] 5 L/min    Body mass index is 34.55 kg/m².     Input and Output Summary (last 24 hours):     Intake/Output Summary (Last 24 hours) at 7/13/2025 0834  Last data filed at 7/13/2025 0623  Gross per 24 hour   Intake 75 ml   Output --   Net 75 ml       Physical Exam  Constitutional:       General: She is not in acute distress.     Appearance: She is ill-appearing. She is not toxic-appearing or diaphoretic.     Cardiovascular:      Rate and Rhythm: Normal rate and regular rhythm.      Pulses: Normal pulses.      Heart sounds: Normal heart  sounds.   Pulmonary:      Effort: No respiratory distress.      Breath sounds: Rhonchi present. No wheezing or rales.     Neurological:      Mental Status: She is alert.           Lines/Drains:  Lines/Drains/Airways       Active Status       Name Placement date Placement time Site Days    External Urinary Catheter 07/12/25  0630  -- 1                            Lab Results: I have reviewed the following results:   Results from last 7 days   Lab Units 07/12/25  0527   WBC Thousand/uL 19.30*   HEMOGLOBIN g/dL 13.1   HEMATOCRIT % 42.1   PLATELETS Thousands/uL 183   BANDS PCT % 10*   LYMPHO PCT % 3*   MONO PCT % 3*   EOS PCT % 0     Results from last 7 days   Lab Units 07/12/25  0527   SODIUM mmol/L 142   POTASSIUM mmol/L 4.2   CHLORIDE mmol/L 108   CO2 mmol/L 22   BUN mg/dL 26*   CREATININE mg/dL 0.87   ANION GAP mmol/L 12   CALCIUM mg/dL 9.0   ALBUMIN g/dL 3.7   TOTAL BILIRUBIN mg/dL 0.86   ALK PHOS U/L 50   ALT U/L 49   AST U/L 52*   GLUCOSE RANDOM mg/dL 219*     Results from last 7 days   Lab Units 07/11/25  1958   INR  1.07     Results from last 7 days   Lab Units 07/12/25  0559 07/12/25  0029 07/11/25  1836   POC GLUCOSE mg/dl 233* 233* 249*     Results from last 7 days   Lab Units 07/11/25  2140   HEMOGLOBIN A1C % 6.3*     Results from last 7 days   Lab Units 07/12/25  0527 07/11/25 2003 07/11/25  1843   LACTIC ACID mmol/L  --  1.9  --    PROCALCITONIN ng/ml 0.43*  --  0.07       Recent Cultures (last 7 days):   Results from last 7 days   Lab Units 07/12/25  0527 07/11/25 2005 07/11/25 2003   BLOOD CULTURE   --  No Growth at 24 hrs.  --    GRAM STAIN RESULT   --   --  Gram positive cocci in clusters*   LEGIONELLA URINARY ANTIGEN  Negative  --   --        Imaging Results Review: No pertinent imaging studies reviewed.  Other Study Results Review: No additional pertinent studies reviewed.    Last 24 Hours Medication List:     Current Facility-Administered Medications:     acetaminophen (TYLENOL) rectal  suppository 325 mg, Q4H PRN    bisacodyl (DULCOLAX) rectal suppository 10 mg, Daily PRN    HYDROmorphone (DILAUDID) injection 0.3 mg, Q3H PRN    hyoscyamine (LEVSIN/SL) SL tablet 0.125 mg, Q6H PRN    LORazepam (ATIVAN) injection 0.5 mg, Q6H PRN    ondansetron (ZOFRAN) injection 4 mg, Q4H PRN    ondansetron (ZOFRAN-ODT) dispersible tablet 4 mg, Q4H PRN    oxyCODONE (ROXICODONE) oral solution 5 mg, Q4H PRN    Administrative Statements   Today, Patient Was Seen By: Lela Caruso PA-C  I have spent a total time of 35 minutes in caring for this patient on the day of the visit/encounter including Counseling / Coordination of care, Documenting in the medical record, Reviewing/placing orders in the medical record (including tests, medications, and/or procedures), and Communicating with other healthcare professionals .    **Please Note: This note may have been constructed using a voice recognition system.**

## 2025-07-13 NOTE — ASSESSMENT & PLAN NOTE
Patient initially presented from LTC at [facility] after aspiration event. SIRS criteria met on admission AEB tachycardia, tachypnea, leukocytosis. Source: Aspiration pneumonia vs pneumonitis. End-organ dysfunction: Acute respiratory failure initially requiring BiPAP.  CXR showing mild patchy bilateral groundglass opacity  UA not overtly suggestive of UTI, with positive nitrites & innumerable bacteria but only 10-20 WBCs.   Procal: 0.07->0.43. BC 1/2: GPC, coagulase-negative staph identified & is likely contaminant. Lactic acid wnl.   MRSA nares cx: Pending. Legionella/strep pneumonia antigens: Negative. COVID/flu antigen: Negative.  Received 2 doses IV Unasyn, initially on IVFs  D/C further antibiotics/IVFs with transition to hospice

## 2025-07-13 NOTE — PLAN OF CARE
Problem: CARDIOVASCULAR - ADULT  Goal: Maintains optimal cardiac output and hemodynamic stability  Description: INTERVENTIONS:  - Monitor I/O, vital signs and rhythm  - Monitor for S/S and trends of decreased cardiac output  - Administer and titrate ordered vasoactive medications to optimize hemodynamic stability  - Assess quality of pulses, skin color and temperature  - Assess for signs of decreased coronary artery perfusion  - Instruct patient to report change in severity of symptoms  Outcome: Progressing     Problem: RESPIRATORY - ADULT  Goal: Achieves optimal ventilation and oxygenation  Description: INTERVENTIONS:  - Assess for changes in respiratory status  - Assess for changes in mentation and behavior  - Position to facilitate oxygenation and minimize respiratory effort  - Oxygen administered by appropriate delivery if ordered  - Initiate smoking cessation education as indicated  - Encourage broncho-pulmonary hygiene including cough, deep breathe, Incentive Spirometry  - Assess the need for suctioning and aspirate as needed  - Assess and instruct to report SOB or any respiratory difficulty  - Respiratory Therapy support as indicated  Outcome: Progressing     Problem: Prexisting or High Potential for Compromised Skin Integrity  Goal: Skin integrity is maintained or improved  Description: INTERVENTIONS:  - Identify patients at risk for skin breakdown  - Assess and monitor skin integrity including under and around medical devices   - Assess and monitor nutrition and hydration status  - Monitor labs  - Assess for incontinence   - Turn and reposition patient  - Assist with mobility/ambulation  - Relieve pressure over tristin prominences   - Avoid friction and shearing  - Provide appropriate hygiene as needed including keeping skin clean and dry  - Evaluate need for skin moisturizer/barrier cream  - Collaborate with interdisciplinary team  - Patient/family teaching  - Consider wound care consult

## 2025-07-13 NOTE — ASSESSMENT & PLAN NOTE
Patient with known history of dementia, son reports patient's clinical status has been declining over the last year. Reports being on hospice 2 weeks ago, previously was under comfort measures off and on at LTC at [facility].  Patient was previously under do not hospitalize orders while DNR/DNI, however son changed his mind when being evaluated in the ED to try treating pneumonia.  Concern for patient's clinical status to continue declining given advanced age, dementia, somnolence/lethargy  Discussed with patient's son, he reinforced DNR/DNI status. I also explained that BiPAP at this point is not an appropriate option given the aspiration event & clinical status did not significantly improve after initial BiPAP trial.  I also suspect the IV antibiotics are providing minimal benefit given clinical status this morning.  Long goals of care discussion with patient's son, ultimately decided to transition to level 4 comfort care & consult hospice services to discuss options  Comfort measures with PRN: Tylenol suppository, IV Ativan, PO solution oxycodone, IV Dilaudid, PO/IV Zofran, SL hyoscyamine, Dulcolax suppository.

## 2025-07-13 NOTE — DISCHARGE SUMMARY
Discharge Summary - Hospitalist   Name: Suzan Wiggins 89 y.o. female I MRN: [id number]  Unit/Bed#: -01 I Date of Admission: 7/11/2025   Date of Service: 7/14/2025 I Hospital Day: 3     Assessment & Plan  Goals of care, counseling/discussion  Patient's respiratory/clinical status declined while admitted for acute respiratory failure following aspiration event at [facility]. Known hx dementia, patient's son notes decline over the past year. Notably, patient was on/off comfort measures & most recently was reported to be on hospice 2 weeks PTA.  Concern for patient's clinical status persistently declining given advanced age, dementia, somnolence/lethargy:  Patient was previously under do not hospitalize orders, however son changed his mind while patient was being evaluated in the ED to try treating for possible pneumonia. Discussed with patient's son, he reinforced DNR/DNI status.   BiPAP trial in ED showed minimal improvement, patient had significant tachypnea, dyspnea & air hunger the following morning of 7/12.   Explained to son that given the aspiration event and minimal improvement on BiPAP, further inventions are unlikely to provide much benefit.   Goals of care discussion with patient's son, ultimately decided to transition to comfort care - plan to continue hospice services at [facility]  Lr catheter placed on 7/13, maintained for comfort  Continue comfort measures with PRN Tylenol suppository, Ativan oral solution, oxycodone oral solution, PO Zofran, SL hyoscyamine, Dulcolax suppository at [facility]  Aspiration pneumonia (HCC)  Severe sepsis (HCC)   Patient initially presented from LTC at [facility] after aspiration event. SIRS criteria met on admission AEB tachycardia, tachypnea, leukocytosis. Source: Aspiration pneumonia vs pneumonitis. End-organ dysfunction: Acute respiratory failure initially requiring BiPAP.  CXR showing mild patchy bilateral groundglass opacity  UA not overtly suggestive of UTI, with  positive nitrites & innumerable bacteria but only 10-20 WBCs.   Procal: 0.07->0.43. BC 1/2: GPC, coagulase-negative staph identified & is likely contaminant. Lactic acid wnl.   MRSA nares cx: Pending. Legionella/strep pneumonia antigens: Negative. COVID/flu antigen: Negative.  Received 2 doses IV Unasyn, initially on IVFs  D/C further antibiotics/IVFs with transition to hospice  Acute respiratory failure with hypoxia (HCC)  Patient brought in from SNF in acute respiratory distress after aspiration event.  Per SNF records, patient was previously on puréed diet however was eating cherry cobbler & aspirated.  Patient initially on NRB, ED was able to suction some food from her airway but was still with significant tachypnea and subsequently was placed on BiPAP  Transitioned off BiPAP to 5L NC O2 on morning of 7/12, however patient still with significant tachypnea, increased WOB and air hunger.  Discussed with attending, would avoid placing patient back on BiPAP given minimal improvement & risk of further aspiration outweighs benefit. Confirmed DNR/DNI status.  D/C BiPAP with transition to hospice  Can continue up to 5L NC O2 PRN for comfort     Medical Problems      Discharging Physician / Practitioner: Lela Caruso PA-C  PCP: No primary care provider on file.  Admission Date:   Admission Orders (From admission, onward)       Ordered        07/11/25 2007  INPATIENT ADMISSION  Once                          Discharge Date: 07/14/25    Next Steps for Physician/AP Assuming Care:  Follow-up with hospice services outpatient    Test Results Pending at Discharge (will require follow up):  None    Medication Changes for Discharge & Rationale:   STOP all prior chronic medications with transition to comfort care, including: Aspirin, Celexa, Seroquel.  Take Zofran-ODT 4 mg tablet q4hrs PRN (for nausea, vomiting  Take Ativan 0.5 my tablet SL q6hrs PRN (for agitation, anxiety)  Take oxycodone oral solution 5 mL [5 mg total] q4hrs  PRN (for severe pain, dyspnea)  See after visit summary for reconciled discharge medications provided to patient and/or family.     Consultations During Hospital Stay:  Hospice    Procedures Performed:   None    Significant Findings / Test Results:   CXR: Low lung volumes producing vascular crowding. Mild patchy bilateral groundglass opacity which could be due to aspiration.    Incidental Findings:   None    Hospital Course:   Suzan Wiggins is a 89 y.o. female patient, PMH dementia, HTN, HLD, who originally presented to the hospital on 7/11/2025 due to acute respiratory failure following aspiration event at SNF. Patient was brought in from LTC facility at [facility], noted to have aspiration event after eating cherry cobbler. On arrival to ED, patient was in acute respiratory distress, initially on nonrebreather with some success suctioning some fluid of her airway but ultimately required BiPAP. CXR was concerning for possible aspiration pneumonia, patient met severe sepsis criteria and was initially started on IV Unasyn.  Patient was previously under do not hospitalize orders, however patient's son had decided to try treating patient for possible pneumonia and agreeable to admission.  Patient's respiratory status did not significantly improve after BiPAP trial overnight 7/11-7/12, when placed back on NC O2 was noted to still have significant dyspnea and air hunger. Goals of care discussions took place with patient's son, ultimately decision was made to stop aggressive interventions and transition to comfort care.  Hospice was consulted, can continue hospice services upon return to LTC facility at [facility] per family request.    No notes on file    Please see above list of diagnoses and related plan for additional information.     Discharge Day Visit / Exam:   Subjective: Patient is seen at bedside this a.m., awake and alert though appearing to be uncomfortable with increased WOB.  Vitals: Blood Pressure: 137/84  "(07/14/25 0803)  Pulse: 68 (07/14/25 0803)  Temperature: 100.5 °F (38.1 °C) (07/14/25 0803)  Temp Source: Axillary (07/14/25 0803)  Respirations: 22 (07/14/25 0039)  Height: 4' 8\" (142.2 cm) (07/12/25 0147)  Weight - Scale: 69.9 kg (154 lb 1.6 oz) (07/12/25 0147)  SpO2: 93 % (07/14/25 0803)  Physical Exam  Constitutional:       General: She is not in acute distress.     Appearance: She is ill-appearing. She is not toxic-appearing or diaphoretic.     Cardiovascular:      Rate and Rhythm: Normal rate and regular rhythm.      Pulses: Normal pulses.      Heart sounds: Normal heart sounds.   Pulmonary:      Effort: No respiratory distress.      Comments: Increased WOB, tachypnea. Mild diffuse rales across lung fields.  Abdominal:      General: There is no distension.      Palpations: Abdomen is soft.      Tenderness: There is no abdominal tenderness. There is no guarding.      Comments: No grimacing of face with palpation of abdomen.     Musculoskeletal:         General: No swelling or tenderness.     Skin:     General: Skin is warm.     Neurological:      General: No focal deficit present.      Mental Status: She is alert.     Psychiatric:      Comments: Anxious          Discussion with Family: Attempted to update  (son) via phone. Left voicemail.     Discharge instructions/Information to patient and family:   See after visit summary for information provided to patient and family.      Provisions for Follow-Up Care:  See after visit summary for information related to follow-up care and any pertinent home health orders.      Mobility at time of Discharge:   Basic Mobility Inpatient Raw Score: 6  JH-HLM Goal: 2: Bed activities/Dependent transfer  JH-HLM Achieved: 1: Laying in bed  HLM Goal NOT achieved. Continue to encourage mobility in post discharge setting.     Disposition:   Other Skilled Nursing Facility at [facility]    Planned Readmission: None    Administrative Statements   Discharge Statement:  I " have spent a total time of 55 minutes in caring for this patient on the day of the visit/encounter. >30 minutes of time was spent on: Counseling / Coordination of care, Documenting in the medical record, Reviewing / ordering tests, medicine, procedures  , and Communicating with other healthcare professionals .    **Please Note: This note may have been constructed using a voice recognition system**

## 2025-07-13 NOTE — ASSESSMENT & PLAN NOTE
SIRS criteria met on admission AEB tachycardia, tachypnea, leukocytosis. Source: Aspiration pneumonia versus pneumonitis. End-organ dysfunction: Acute respiratory failure requiring BiPAP. Patient initially presented from [facility] after aspiration event.   CXR not yet formally read, no overt consolidation but not patient did not have great positioning  UA not overtly suggestive of UTI, with positive nitrites & innumerable bacteria but only 10-20 WBCs.   Procal: 0.07->0.43. BC x2: Pending. Lactic acid wnl.   MRSA nares cx: Pending. Legionella/strep pneumonia antigens: Pending. COVID/flu antigen: Negative.  Received IV Unasyn in ED, placed on continuous IVFs overnight while NPO with lethargy  D/C further IV antibiotics & IVFs with transition to hospice, patient's son is in agreement

## 2025-07-13 NOTE — PLAN OF CARE
Problem: Prexisting or High Potential for Compromised Skin Integrity  Goal: Skin integrity is maintained or improved  Description: INTERVENTIONS:  - Identify patients at risk for skin breakdown  - Assess and monitor skin integrity including under and around medical devices   - Assess and monitor nutrition and hydration status  - Monitor labs  - Assess for incontinence   - Turn and reposition patient  - Assist with mobility/ambulation  - Relieve pressure over tristin prominences   - Avoid friction and shearing  - Provide appropriate hygiene as needed including keeping skin clean and dry  - Evaluate need for skin moisturizer/barrier cream  - Collaborate with interdisciplinary team  - Patient/family teaching  - Consider wound care consult    Assess:  - Review Nik scale daily  - Clean and moisturize skin every shift   - Inspect skin when repositioning, toileting, and assisting with ADLS  - Assess extremities for adequate circulation and sensation     Bed Management:  - Have minimal linens on bed & keep smooth, unwrinkled  - Change linens as needed when moist or perspiring  - Avoid sitting or lying in one position for more than 2 hours while in bed?Keep HOB at 30degrees   - Toileting:  - Offer bedside commode  - Assess for incontinence every 2 hours      Activity:  - Encourage activity and walks on unit  - Encourage or provide ROM exercises   - Turn and reposition patient every 2 Hours  - Use appropriate equipment to lift or move patient in bed    Skin Care:  - Avoid use of baby powder, tape, friction and shearing, hot water or constrictive clothing    Outcome: Progressing     Problem: RESPIRATORY - ADULT  Goal: Achieves optimal ventilation and oxygenation  Description: INTERVENTIONS:  - Assess for changes in respiratory status  - Assess for changes in mentation and behavior  - Position to facilitate oxygenation and minimize respiratory effort  - Oxygen administered by appropriate delivery if ordered  - Initiate  smoking cessation education as indicated  - Encourage broncho-pulmonary hygiene including cough, deep breathe, Incentive Spirometry  - Assess the need for suctioning and aspirate as needed  - Assess and instruct to report SOB or any respiratory difficulty  - Respiratory Therapy support as indicated  Outcome: Progressing

## 2025-07-13 NOTE — ASSESSMENT & PLAN NOTE
Patient's respiratory/clinical status declined while admitted for acute respiratory failure following aspiration event at SNF. Known hx dementia, patient's son notes decline over the past year. Notably, patient was on/off comfort measures & most recently was reported to be on hospice 2 weeks PTA.  Concern for patient's clinical status persistently declining given advanced age, dementia, somnolence/lethargy:  Patient was previously under do not hospitalize orders, however son changed his mind while patient was being evaluated in the ED to try treating for possible pneumonia. Discussed with patient's son, he reinforced DNR/DNI status.   BiPAP trial in ED showed minimal improvement, patient had significant tachypnea, dyspnea & air hunger the following morning of 7/12.   Explained to son that given the aspiration event and minimal improvement on BiPAP, further inventions are unlikely to provide much benefit.   Goals of care discussion with patient's son, ultimately decided to transition to comfort care - plan to continue hospice services at [facility]  Lr catheter placed on 7/13, maintained for comfort  Continue comfort measures with PRN Tylenol suppository, Ativan oral solution, oxycodone oral solution, PO Zofran, SL hyoscyamine, Dulcolax suppository at [facility]

## 2025-07-13 NOTE — ASSESSMENT & PLAN NOTE
Patient brought in from SNF in acute respiratory distress after aspiration event.  Per SNF records, patient was previously on puréed diet however was eating cherry cobbler & aspirated.  Patient initially on NRB, ED was able to suction some food from her airway but was still with significant tachypnea and subsequently was placed on BiPAP  Transitioned off BiPAP to 5L NC O2 on morning of 7/12, however patient still with significant tachypnea, increased WOB and air hunger.  Discussed with attending, would avoid placing patient back on BiPAP given minimal improvement & risk of further aspiration outweighs benefit. Confirmed DNR/DNI status.  D/C BiPAP with transition to hospice  Can continue up to 5L NC O2 PRN for comfort

## 2025-07-13 NOTE — HOSPICE NOTE
Patient approved for RLOC which is provided at home or SNF. Spoke with son Yovani, he is agreeable to hospice services at [facility] where patient resides since July 3, 2025. Expalined hospice benefits per Medicare Guidelines and answerd all questions. Updated IPCM Filipe and JAZMYN Caruso. Plan will be to keep her overnight to ensure she can tolerate PO liquid pain medications and we can manage any symptoms and then decide on DC disposition and plan to return to [facility]. Will ask weekday Liaison to follow up

## 2025-07-14 NOTE — PLAN OF CARE
Problem: Potential for Falls  Goal: Patient will remain free of falls  Description: INTERVENTIONS:  - Educate patient/family on patient safety including physical limitations  - Instruct patient to call for assistance with activity   - Consider consulting OT/PT to assist with strengthening/mobility based on AM PAC & -HLM score  - Consult OT/PT to assist with strengthening/mobility   - Keep Call bell within reach  - Keep bed low and locked with side rails adjusted as appropriate  - Keep care items and personal belongings within reach  - Initiate and maintain comfort rounds  - Make Fall Risk Sign visible to staff  - Apply yellow socks and bracelet for high fall risk patients  - Consider moving patient to room near nurses station  Outcome: Adequate for Discharge     Problem: Prexisting or High Potential for Compromised Skin Integrity  Goal: Skin integrity is maintained or improved  Description: INTERVENTIONS:  - Identify patients at risk for skin breakdown  - Assess and monitor skin integrity including under and around medical devices   - Assess and monitor nutrition and hydration status  - Monitor labs  - Assess for incontinence   - Turn and reposition patient  - Assist with mobility/ambulation  - Relieve pressure over tristin prominences   - Avoid friction and shearing  - Provide appropriate hygiene as needed including keeping skin clean and dry  - Evaluate need for skin moisturizer/barrier cream  - Collaborate with interdisciplinary team  - Patient/family teaching  - Consider wound care consult    Assess:  - Review Nik scale daily    - Inspect skin when repositioning, toileting, and assisting with ADLS    - Assess extremities for adequate circulation and sensation     Bed Management:  - Have minimal linens on bed & keep smooth, unwrinkled  - Change linens as needed when moist or perspiring    - Offer bedside commode        Activity:    - Encourage activity and walks on unit  - Encourage or provide ROM exercises    -  - Use appropriate equipment to lift or move patient in bed      Skin Care:  - Avoid use of baby powder, tape, friction and shearing, hot water or constrictive clothing  - Do not massage red bony areas    Next Steps:  Outcome: Adequate for Discharge     Problem: CARDIOVASCULAR - ADULT  Goal: Maintains optimal cardiac output and hemodynamic stability  Description: INTERVENTIONS:  - Monitor I/O, vital signs and rhythm  - Monitor for S/S and trends of decreased cardiac output  - Administer and titrate ordered vasoactive medications to optimize hemodynamic stability  - Assess quality of pulses, skin color and temperature  - Assess for signs of decreased coronary artery perfusion  - Instruct patient to report change in severity of symptoms  Outcome: Adequate for Discharge  Goal: Absence of cardiac dysrhythmias or at baseline rhythm  Description: INTERVENTIONS:  - Continuous cardiac monitoring, vital signs, obtain 12 lead EKG if ordered  - Administer antiarrhythmic and heart rate control medications as ordered  - Monitor electrolytes and administer replacement therapy as ordered  Outcome: Adequate for Discharge     Problem: RESPIRATORY - ADULT  Goal: Achieves optimal ventilation and oxygenation  Description: INTERVENTIONS:  - Assess for changes in respiratory status  - Assess for changes in mentation and behavior  - Position to facilitate oxygenation and minimize respiratory effort  - Oxygen administered by appropriate delivery if ordered  - Initiate smoking cessation education as indicated  - Encourage broncho-pulmonary hygiene including cough, deep breathe, Incentive Spirometry  - Assess the need for suctioning and aspirate as needed  - Assess and instruct to report SOB or any respiratory difficulty  - Respiratory Therapy support as indicated  Outcome: Adequate for Discharge

## 2025-07-14 NOTE — HOSPICE NOTE
Spoke with pts son Yovani and reviewed hospice care and services with him per  guidelines. All questions answered. He is in agreement with the same. Consents sent via [employer]. Requested scripts for her comfort meds be sent with her to the facility, Pt will open to hospice on Wednesday

## 2025-07-14 NOTE — PLAN OF CARE
Problem: RESPIRATORY - ADULT  Goal: Achieves optimal ventilation and oxygenation  Description: INTERVENTIONS:  - Assess for changes in respiratory status  - Assess for changes in mentation and behavior  - Position to facilitate oxygenation and minimize respiratory effort  - Oxygen administered by appropriate delivery if ordered  - Initiate smoking cessation education as indicated  - Encourage broncho-pulmonary hygiene including cough, deep breathe, Incentive Spirometry  - Assess the need for suctioning and aspirate as needed  - Assess and instruct to report SOB or any respiratory difficulty  - Respiratory Therapy support as indicated  7/13/2025 2225 by Arcelia Parker RN  Outcome: Progressing    Problem: Potential for Falls  Goal: Patient will remain free of falls  Description: INTERVENTIONS:  - Educate patient/family on patient safety including physical limitations  - Instruct patient to call for assistance with activity   - Consider consulting OT/PT to assist with strengthening/mobility based on AM PAC & JH-HLM score  - Consult OT/PT to assist with strengthening/mobility   - Keep Call bell within reach  - Keep bed low and locked with side rails adjusted as appropriate  - Keep care items and personal belongings within reach  - Initiate and maintain comfort rounds  - Make Fall Risk Sign visible to staff  - Offer Toileting every 2 Hours, in advance of need  - Initiate/Maintain bed alarm  - Obtain necessary fall risk management equipment: floor cushion  - Apply yellow socks and bracelet for high fall risk patients  - Consider moving patient to room near nurses station  Outcome: Progressing

## 2025-07-14 NOTE — CASE MANAGEMENT
Case Management Assessment & Discharge Planning Note    Patient name Suzan Wiggins  Location /-01 MRN [id number]  : 3/28/1926 Date 2025       Current Admission Date: 2025  Current Admission Diagnosis:Goals of care, counseling/discussion   Patient Active Problem List    Diagnosis Date Noted    Goals of care, counseling/discussion 2025    Severe sepsis (HCC) 2025    Aspiration pneumonia (HCC) 2025    Acute respiratory failure with hypoxia (HCC) 2025    Dementia (HCC) 2025    Hyperlipidemia 2025    Essential hypertension 2025    Atrial fibrillation (HCC) 2025    Epigastric pain 2021    Gas pain 2021      LOS (days): 3  Geometric Mean LOS (GMLOS) (days): 4.9  Days to GMLOS:2.4     OBJECTIVE:    Risk of Unplanned Readmission Score: 14.59     Current admission status: Inpatient     Preferred Pharmacy:   [facility]  [facility] - [address]  [address] [place] [address]  Phone: [phone] Fax: [phone]    Primary Care Provider: No primary care provider on file.    Primary Insurance: MEDICARE  Secondary Insurance:     ASSESSMENT:  Active Health Care Proxies    There are no active Health Care Proxies on file.    Advance Directives  Does patient have a Health Care POA?: No  Does patient have Advance Directives?: No    Readmission Root Cause  30 Day Readmission: No    Patient Information  Admitted from:: Facility ([facility])  Mental Status: Confused  During Assessment patient was accompanied by: Not accompanied during assessment  Assessment information provided by:: Son  Primary Caregiver: Other (Comment)  Caregiver's Name:: [facility]  Caregiver's Relationship to Patient:: Facility Staff  Support Systems: Son  County of Residence: [place]  What city do you live in?: [place]  Type of Current Residence: Facility  Living Arrangements: Lives in Facility  Is patient a ?:  No    Activities of Daily Living Prior to Admission  Functional Status: Total dependent  Does the patient have a history of Short-Term Rehab?: Yes    Patient Information Continued  Income Source: SSI/SSD  Does patient have prescription coverage?: Yes  Can the patient afford their medications and any related supplies (such as glucometers or test strips)?: Yes  Does patient receive dialysis treatments?: No  Does patient have a history of substance abuse?: No  Does patient have a history of Mental Health Diagnosis?: No    Means of Transportation  Means of Transport to Appts:: Family transport    DISCHARGE DETAILS:    Discharge planning discussed with:: Patient's Son (Yovani)  [place] of Choice: Yes  Comments - Freedom of Choice: Return to [facility]/ [facility]  CM contacted family/caregiver?: Yes  Were Treatment Team discharge recommendations reviewed with patient/caregiver?: Yes  Did patient/caregiver verbalize understanding of patient care needs?: N/A- going to facility  Were patient/caregiver advised of the risks associated with not following Treatment Team discharge recommendations?: Yes    Contacts  Patient Contacts: Yovani (son)  Relationship to Patient:: Family  Contact Method: Phone  Phone Number: [phone]  Reason/Outcome: Emergency Contact, Discharge Planning    Requested Home Health Care         Is the patient interested in HHC at discharge?: No    DME Referral Provided  Referral made for DME?: No    Would you like to participate in our [facility] service program?  : No - Declined    Treatment Team Recommendation: Facility Return, SNF, Hospice  Expected Discharge Disposition: Hospice  Additional Discharge Dispositions: Skilled Nursing Facility, Hospice, Facility Return  Transport at Discharge : [facility]  ETA of Transport (Date): 07/14/25  ETA of Transport (Time): 1200    Accepting Facility Name, City & State : [facility]: [address] [place] [address]  Receiving  Facility/Agency Phone Number: [phone]  Facility/Agency Fax Number: [phone]

## 2025-07-16 NOTE — PROGRESS NOTES
Face-To-Face Recertification Visit - [facility]    Date of Visit: 2025    BP: #21  (2025 - 2025)   Patient's name: Suzan Wiggins   Female  : 3/28/1926     Age: 89  RN Case Manager: Brandi Camacho   SOC: 2025     TEAM: Green      Code status: DNR      Hospice Diagnosis: dementia with aspiration pneumonitis    Secondary and Related comorbid diagnoses include: TBD    Unrelated comorbid diagnoses include: TBD      Medication Changes: patient placed on comfort medications only, morphine 20mg/mL SL 5mg q4atc and q1prn, lorazepam 2mg/mL SL 0.5mg q4atc and q1prn, and bisacodyl suppository daily prn     Allergies: tetanus toxoid    Functional status:  PPS: 20%       Nutritional status: LMAC: 2025 - 22cm     Wgt: 2025 - 154lbs    Diet changed to pleasure feeds and staff instructed not to feed patient unless she is fully awake and alert.     Vital Signs: BP: 124/70  HR: 80  RR: 24    O2: 5L  Continence: yuan, incontinent of bowel  Mobility: bedbound  ADLs: max assistance required with __6___ /6 ADLs which includes ambulation, feeding, dressing, toileting, bathing and transferring.  Sleep: unresponsive  Skin is clammy. Wounds noted stage 1 right and left lower legs being treated with skin prep, stage 2 sacral area being treated with triad paste    ATTESTATION OF ENCOUNTER: I attest that I personally made a face-to-face encounter with Suzan Wiggins on 2025 and have provided the following clinical findings to the certifying physician for use in determining continued eligibility by the certifying physician for hospice care.    SUMMARY OF COURSE DURING CURRENT BENEFIT PERIOD:  Suzan Wiggins is a 89-year-old female being admitted to hospice on her  benefit period with a diagnosis of dementia with aspiration pneumonitis. She was seen this morning with the admission RN, in her bed at the [facility] facility where she resides. Upon examination, Suzan was unresponsive, breathing was tachypneic and labored  with notable abdominal breathing. Rhonchi was noted throughout. Patient's eyes were partially open, but unfocused. Distal pulses difficult to palpate, skin clammy, knees cool, feet mottled.      ATTESTATION: By this signature, I confirm that I personally composed these clinical findings based on review of this patient's medical record and examination of this patient. FLAQUITA Gonsales

## 2025-07-19 NOTE — TELEPHONE ENCOUNTER
Nursing home or Independent living name: Nursing home  If its not nursing home or Independent living, do they see PCP in Network: Nursing home  Who is calling: Tobias Norris RN   Callback number: [phone]  Symptoms: Notification of death  Did you page oncall or place in triage hub: on-call    On-call provider notified via ESC.

## 2025-07-19 NOTE — TELEPHONE ENCOUNTER
Patient/: 3/28/1926    Reason for Call: Notification of Death    Caller's Name: Tobias Norris, KARIE Supervisor    Caller's Relationship to Patient: RN    Caller's Callback Number:[phone]    Home Health OR Hospice Patient: Hospice    Please acknowledge receipt of this message; thank you!    ESC message sent to on-call nurse; verified receipt of message.

## 2025-07-29 ENCOUNTER — HOME CARE VISIT (OUTPATIENT)
Dept: HOME HOSPICE | Facility: HOSPICE | Age: OVER 89
End: 2025-07-29
Payer: MEDICARE